# Patient Record
Sex: FEMALE | Race: WHITE | NOT HISPANIC OR LATINO | Employment: FULL TIME | URBAN - METROPOLITAN AREA
[De-identification: names, ages, dates, MRNs, and addresses within clinical notes are randomized per-mention and may not be internally consistent; named-entity substitution may affect disease eponyms.]

---

## 2017-01-04 ENCOUNTER — PRENATAL OFFICE VISIT (OUTPATIENT)
Dept: OBGYN | Facility: CLINIC | Age: 27
End: 2017-01-04
Payer: COMMERCIAL

## 2017-01-04 VITALS
BODY MASS INDEX: 22.71 KG/M2 | TEMPERATURE: 97.1 F | HEART RATE: 74 BPM | HEIGHT: 66 IN | SYSTOLIC BLOOD PRESSURE: 113 MMHG | DIASTOLIC BLOOD PRESSURE: 80 MMHG | WEIGHT: 141.3 LBS

## 2017-01-04 PROCEDURE — 99207 ZZC POST PARTUM EXAM: CPT | Performed by: OBSTETRICS & GYNECOLOGY

## 2017-01-04 RX ORDER — PRENATAL VIT/IRON FUM/FOLIC AC 27MG-0.8MG
1 TABLET ORAL DAILY
COMMUNITY
End: 2020-09-16

## 2017-01-04 NOTE — PROGRESS NOTES
Citlalli is here for a 6-week postpartum checkup.    She had a c/s for breech of a viable girl, weight 7 pounds 4 oz., with no complications.  Since delivery, she has been breast feeding.  She has no signs of infection, bleeding or other complications.  She is not pregnant.  We discussed contraceptions and she has chosen condoms.   She denies feeling sad, depressed or too overwhelmed.    PHQ-9 SCORE 1/4/2017   Total Score 0         EXAM:    HEENT: grossly normal.  NECK: no lymphadenopathy or thyroidomegaly.  LUNGS: CTA X 2, no rales or crackles.  BACK: No spinal or CVA tenderness.  HEART: RRR without murmurs clicks or gallops.  ABDOMEN: soft, non tender, good bowel sounds, without masses rebound, guarding or tenderness. Incision well healed.    PELVIC:    Deferred    EXTREMITIES: Warm to touch, good pulses, no ankle edema or calf tenderness.  NEUROLOGIC: grossly normal.    ASSESSMENT:   Normal 6-week postpartum exam after c/s for breech.    PLAN:  Pap smear utd and condoms for contraception. Ok to resume normal activities.    DUY WHITE MD

## 2017-01-04 NOTE — MR AVS SNAPSHOT
"              After Visit Summary   2017    Citlalli Martin    MRN: [id number]           Patient Information     Date Of Birth          1990        Visit Information        Provider Department      2017 11:00 AM Etta Em MD [facility] [place]        Today's Diagnoses     Routine postpartum follow-up    -  1        Follow-ups after your visit        Who to contact     If you have questions or need follow up information about today's clinic visit or your schedule please contact [facility] directly at [phone].  Normal or non-critical lab and imaging results will be communicated to you by GroovinAdshart, letter or phone within 4 business days after the clinic has received the results. If you do not hear from us within 7 days, please contact the clinic through GroovinAdshart or phone. If you have a critical or abnormal lab result, we will notify you by phone as soon as possible.  Submit refill requests through Nano Terra or call your pharmacy and they will forward the refill request to us. Please allow 3 business days for your refill to be completed.          Additional Information About Your Visit        MyChart Information     Nano Terra lets you send messages to your doctor, view your test results, renew your prescriptions, schedule appointments and more. To sign up, go to [email or web address] . Click on \"Log in\" on the left side of the screen, which will take you to the Welcome page. Then click on \"Sign up Now\" on the right side of the page.     You will be asked to enter the access code listed below, as well as some personal information. Please follow the directions to create your username and password.     Your access code is: [email or web address]  Expires: 2017  9:56 AM     Your access code will  in 90 days. If you need help or a new code, please call your [facility] or [phone].        Care EveryWhere ID     This is your Care EveryWhere ID. This could be used by " "other organizations to access your [facility] medical records  [id number]        Your Vitals Were     Pulse Temperature Height BMI (Body Mass Index) Breastfeeding?       74 97.1  F (36.2  C) (Oral) 5' 6\" (1.676 m) 22.82 kg/m2 Yes        Blood Pressure from Last 3 Encounters:   01/04/17 113/80   11/25/16 121/84   11/16/16 126/79    Weight from Last 3 Encounters:   01/04/17 141 lb 4.8 oz (64.093 kg)   11/16/16 161 lb (73.029 kg)   11/08/16 159 lb 3.2 oz (72.213 kg)              Today, you had the following     No orders found for display       Primary Care Provider Office Phone # Fax #    Kenny Roberson [phone]       [address] [address]        Thank you!     Thank you for choosing [facility]  for your care. Our goal is always to provide you with excellent care. Hearing back from our patients is one way we can continue to improve our services. Please take a few minutes to complete the written survey that you may receive in the mail after your visit with us. Thank you!             Your Updated Medication List - Protect others around you: Learn how to safely use, store and throw away your medicines at www.disposemymeds.org.          This list is accurate as of: 1/4/17 12:11 PM.  Always use your most recent med list.                   Brand Name Dispense Instructions for use    AZATHIOPRINE PO      Take 125 mg by mouth 2 times daily       prenatal multivitamin  plus iron 27-0.8 MG Tabs per tablet      Take 1 tablet by mouth daily         "

## 2017-01-04 NOTE — NURSING NOTE
"Chief Complaint   Patient presents with     Post Partum Exam     post ob       Initial /80 mmHg  Pulse 74  Temp(Src) 97.1  F (36.2  C) (Oral)  Ht 5' 6\" (1.676 m)  Wt 141 lb 4.8 oz (64.093 kg)  BMI 22.82 kg/m2  Breastfeeding? Yes Estimated body mass index is 22.82 kg/(m^2) as calculated from the following:    Height as of this encounter: 5' 6\" (1.676 m).    Weight as of this encounter: 141 lb 4.8 oz (64.093 kg).  BP completed using cuff size: regular        The following HM Due: NONE      The following patient reported/Care Every where data was sent to:  P ABSTRACT QUALITY INITIATIVES [[id number]]       patient has appointment for today  Francisca Bassett                 "

## 2017-01-05 ASSESSMENT — PATIENT HEALTH QUESTIONNAIRE - PHQ9: SUM OF ALL RESPONSES TO PHQ QUESTIONS 1-9: 0

## 2018-09-12 ENCOUNTER — PRENATAL OFFICE VISIT (OUTPATIENT)
Dept: OBGYN | Facility: CLINIC | Age: 28
End: 2018-09-12
Payer: COMMERCIAL

## 2018-09-12 ENCOUNTER — PRENATAL OFFICE VISIT (OUTPATIENT)
Dept: NURSING | Facility: CLINIC | Age: 28
End: 2018-09-12
Payer: COMMERCIAL

## 2018-09-12 VITALS
HEART RATE: 81 BPM | WEIGHT: 121.8 LBS | HEIGHT: 66 IN | DIASTOLIC BLOOD PRESSURE: 87 MMHG | TEMPERATURE: 98 F | BODY MASS INDEX: 19.58 KG/M2 | SYSTOLIC BLOOD PRESSURE: 115 MMHG

## 2018-09-12 VITALS
WEIGHT: 121.8 LBS | HEART RATE: 81 BPM | BODY MASS INDEX: 19.58 KG/M2 | DIASTOLIC BLOOD PRESSURE: 81 MMHG | TEMPERATURE: 98 F | HEIGHT: 66 IN | SYSTOLIC BLOOD PRESSURE: 115 MMHG

## 2018-09-12 DIAGNOSIS — O99.611: ICD-10-CM

## 2018-09-12 DIAGNOSIS — K50.90: ICD-10-CM

## 2018-09-12 DIAGNOSIS — Z12.4 CERVICAL CANCER SCREENING: ICD-10-CM

## 2018-09-12 DIAGNOSIS — Z34.90 SUPERVISION OF NORMAL PREGNANCY: Primary | ICD-10-CM

## 2018-09-12 DIAGNOSIS — O34.219 PREVIOUS CESAREAN DELIVERY, ANTEPARTUM CONDITION OR COMPLICATION: Primary | ICD-10-CM

## 2018-09-12 DIAGNOSIS — O21.9 NAUSEA AND VOMITING IN PREGNANCY: ICD-10-CM

## 2018-09-12 DIAGNOSIS — Z23 NEED FOR TDAP VACCINATION: ICD-10-CM

## 2018-09-12 PROBLEM — O99.619: Status: ACTIVE | Noted: 2018-09-12

## 2018-09-12 LAB
ABO + RH BLD: NORMAL
ABO + RH BLD: NORMAL
ALBUMIN UR-MCNC: NEGATIVE MG/DL
APPEARANCE UR: CLEAR
BILIRUB UR QL STRIP: NEGATIVE
BLD GP AB SCN SERPL QL: NORMAL
BLOOD BANK CMNT PATIENT-IMP: NORMAL
COLOR UR AUTO: YELLOW
ERYTHROCYTE [DISTWIDTH] IN BLOOD BY AUTOMATED COUNT: 12.3 % (ref 10–15)
GLUCOSE UR STRIP-MCNC: NEGATIVE MG/DL
HCT VFR BLD AUTO: 37.9 % (ref 35–47)
HGB BLD-MCNC: 13 G/DL (ref 11.7–15.7)
HGB UR QL STRIP: NEGATIVE
KETONES UR STRIP-MCNC: NEGATIVE MG/DL
LEUKOCYTE ESTERASE UR QL STRIP: NEGATIVE
MCH RBC QN AUTO: 30.1 PG (ref 26.5–33)
MCHC RBC AUTO-ENTMCNC: 34.3 G/DL (ref 31.5–36.5)
MCV RBC AUTO: 88 FL (ref 78–100)
NITRATE UR QL: NEGATIVE
PH UR STRIP: 6 PH (ref 5–7)
PLATELET # BLD AUTO: 259 10E9/L (ref 150–450)
RBC # BLD AUTO: 4.32 10E12/L (ref 3.8–5.2)
SOURCE: NORMAL
SP GR UR STRIP: 1.01 (ref 1–1.03)
SPECIMEN EXP DATE BLD: NORMAL
UROBILINOGEN UR STRIP-ACNC: 0.2 EU/DL (ref 0.2–1)
WBC # BLD AUTO: 9.3 10E9/L (ref 4–11)

## 2018-09-12 PROCEDURE — 99207 ZZC NO CHARGE NURSE ONLY: CPT

## 2018-09-12 PROCEDURE — G0145 SCR C/V CYTO,THINLAYER,RESCR: HCPCS | Performed by: OBSTETRICS & GYNECOLOGY

## 2018-09-12 PROCEDURE — 81003 URINALYSIS AUTO W/O SCOPE: CPT | Performed by: OBSTETRICS & GYNECOLOGY

## 2018-09-12 PROCEDURE — 86900 BLOOD TYPING SEROLOGIC ABO: CPT | Performed by: OBSTETRICS & GYNECOLOGY

## 2018-09-12 PROCEDURE — 36415 COLL VENOUS BLD VENIPUNCTURE: CPT | Performed by: OBSTETRICS & GYNECOLOGY

## 2018-09-12 PROCEDURE — 86780 TREPONEMA PALLIDUM: CPT | Performed by: OBSTETRICS & GYNECOLOGY

## 2018-09-12 PROCEDURE — 86850 RBC ANTIBODY SCREEN: CPT | Performed by: OBSTETRICS & GYNECOLOGY

## 2018-09-12 PROCEDURE — 85027 COMPLETE CBC AUTOMATED: CPT | Performed by: OBSTETRICS & GYNECOLOGY

## 2018-09-12 PROCEDURE — 99207 ZZC FIRST OB VISIT: CPT | Performed by: OBSTETRICS & GYNECOLOGY

## 2018-09-12 PROCEDURE — 86762 RUBELLA ANTIBODY: CPT | Performed by: OBSTETRICS & GYNECOLOGY

## 2018-09-12 PROCEDURE — 86901 BLOOD TYPING SEROLOGIC RH(D): CPT | Performed by: OBSTETRICS & GYNECOLOGY

## 2018-09-12 PROCEDURE — 87086 URINE CULTURE/COLONY COUNT: CPT | Performed by: OBSTETRICS & GYNECOLOGY

## 2018-09-12 PROCEDURE — 87340 HEPATITIS B SURFACE AG IA: CPT | Performed by: OBSTETRICS & GYNECOLOGY

## 2018-09-12 PROCEDURE — 87389 HIV-1 AG W/HIV-1&-2 AB AG IA: CPT | Performed by: OBSTETRICS & GYNECOLOGY

## 2018-09-12 PROCEDURE — 87624 HPV HI-RISK TYP POOLED RSLT: CPT | Performed by: OBSTETRICS & GYNECOLOGY

## 2018-09-12 RX ORDER — ONDANSETRON 4 MG/1
4 TABLET, FILM COATED ORAL EVERY 8 HOURS PRN
Qty: 60 TABLET | Refills: 1 | Status: SHIPPED | OUTPATIENT
Start: 2018-09-12 | End: 2019-02-19

## 2018-09-12 NOTE — MR AVS SNAPSHOT
After Visit Summary   9/12/2018    Citlalli Martin    MRN: [id number]           Patient Information     Date Of Birth          1990        Visit Information        Provider Department      9/12/2018 10:00 AM RD OB NURSE EDUCATION [facility]        Today's Diagnoses     Supervision of normal pregnancy    -  1    Need for Tdap vaccination           Follow-ups after your visit        Additional Services     MAT FETAL MED CTR REFERRAL-PREGNANCY       Body mass index is 19.66 kg/(m^2).    >> Patient may proceed with recommendations for further testing as directed by the Maternal Fetal Medicine Specialist >>    >> If requesting Fetal Echo: MFM will determine appropriate location for exam due to indication.    >> If requesting Lung Maturity Amnio:  If results indicate fetal lung maturity, induction or C/S is recommended within 36 hours.  Please schedule accordingly.     Dear Patient:   Please be aware that coverage of these services is subject to the terms and limitations of your health insurance plan.  Call member services at your health plan with any benefit or coverage questions.      Please bring the following to your appointment:    >>  Any x-rays, CTs or MRIs which have been performed.  Contact the facility where they were done to arrange for  prior to your scheduled appointment.  Any new CT, MRI or other procedures ordered by your specialist must be performed at a [facility] facility or coordinated by your clinic's referral office.  >>  List of current medications   >>  This referral request   >>  Any documents/labs given to you for this referral                  Your next 10 appointments already scheduled     Sep 18, 2018 12:40 PM CDT   US OB < 14 WEEKS SINGLE with RDUS1   [facility] ([facility])    [address] [place] [address]  [address] [place] [address] [phone]           How do I prepare for my exam? (Food and drink instructions)  Drink four 8-ounce glasses of fluid an hour before your exam. If you need to empty your bladder before your exam, try to release only a little urine. Then, drink another glass of fluid.  How do I prepare for my exam? (Other instructions) You may have up to two family members in the exam room. If you bring a small child, an adult must be there to care for him or her. No video or camera photography during the procedure.  What should I wear: Wear comfortable clothes.  How long does the exam take: Most ultrasounds take 30 to 60 minutes.  What should I bring: Bring a list of your medicines, including vitamins, minerals and over-the-counter drugs. It is safest to leave personal items at home.  Do I need a :  No  is needed.  What do I need to tell my doctor: Tell your doctor about any allergies you may have.  What should I do after the exam: No restrictions, You may resume normal activities.  What is this test: An ultrasound uses sound waves to make pictures of the body. Sound waves do not cause pain. The only discomfort may be the pressure of the wand against your skin or full bladder.  Who should I call with questions: If you have any questions, please call the Imaging Department where you will have your exam. Directions, parking instructions, and other information is available on our website, [employer]scroll kit/imaging.            Oct 09, 2018  9:30 AM CDT   ESTABLISHED PRENATAL with Etta Em MD   [facility] ([facility])    [address] [place] [address] [phone]              Future tests that were ordered for you today     Open Future Orders        Priority Expected Expires Ordered    US OB < 14 Weeks Single Routine  9/12/2019 9/12/2018    MFM Genetic Counseling Routine  9/12/2019 9/12/2018    Maternal Fetal Nuchal Translucency Routine  7/12/2019 9/12/2018            Who to contact     If you have questions or need follow up information  "about today's clinic visit or your schedule please contact [facility] directly at [phone].  Normal or non-critical lab and imaging results will be communicated to you by O' Doughty'shart, letter or phone within 4 business days after the clinic has received the results. If you do not hear from us within 7 days, please contact the clinic through O' Doughty'shart or phone. If you have a critical or abnormal lab result, we will notify you by phone as soon as possible.  Submit refill requests through Avtozaper or call your pharmacy and they will forward the refill request to us. Please allow 3 business days for your refill to be completed.          Additional Information About Your Visit        O' Doughty'shart Information     Avtozaper lets you send messages to your doctor, view your test results, renew your prescriptions, schedule appointments and more. To sign up, go to [email or web address] . Click on \"Log in\" on the left side of the screen, which will take you to the Welcome page. Then click on \"Sign up Now\" on the right side of the page.     You will be asked to enter the access code listed below, as well as some personal information. Please follow the directions to create your username and password.     Your access code is: [email or web address]  Expires: 2018 12:24 PM     Your access code will  in 90 days. If you need help or a new code, please call your [facility] clinic or [phone].        Care EveryWhere ID     This is your Care EveryWhere ID. This could be used by other organizations to access your [facility] medical records  [id number]        Your Vitals Were     Pulse Temperature Height Last Period Breastfeeding? BMI (Body Mass Index)    81 98  F (36.7  C) 5' 6\" (1.676 m) 2018 No 19.66 kg/m2       Blood Pressure from Last 3 Encounters:   18 115/87   18 115/81   17 113/80    Weight from Last 3 Encounters:   18 121 lb 12.8 oz (55.2 kg)   18 121 lb 12.8 oz (55.2 kg)   17 " 141 lb 4.8 oz (64.1 kg)              We Performed the Following     ABO/Rh type and screen     CBC with platelets     Hepatitis B surface antigen     HIV Antigen Antibody Combo     MAT FETAL MED CTR REFERRAL-PREGNANCY     Rubella Antibody IgG Quantitative     Treponema Abs w Reflex to RPR and Titer     UA without Microscopic     Urine Culture Aerobic Bacterial        Primary Care Provider Office Phone # Fax #    Kenny Roberson [phone]       [facility] [address] [facility] [address]        Equal Access to Services     BRANDON HANCOCK : Hadii aad ku hadasho Soomaali, waaxda luqadaha, qaybta kaalmada adeegyada, waxay idiin hayaan adeeg kharalisa laalejandra kelley. So [facility] [phone].    ATENCIÓN: Si habla español, tiene a cat disposición servicios gratuitos de asistencia lingüística. [facility] [phone].    We comply with applicable federal civil rights laws and [place] laws. We do not discriminate on the basis of race, color, national origin, age, disability, sex, sexual orientation, or gender identity.            Thank you!     Thank you for choosing [facility]  for your care. Our goal is always to provide you with excellent care. Hearing back from our patients is one way we can continue to improve our services. Please take a few minutes to complete the written survey that you may receive in the mail after your visit with us. Thank you!             Your Updated Medication List - Protect others around you: Learn how to safely use, store and throw away your medicines at www.disposemymeds.org.          This list is accurate as of 9/12/18 12:24 PM.  Always use your most recent med list.                   Brand Name Dispense Instructions for use Diagnosis    AZATHIOPRINE PO      Take 125 mg by mouth 2 times daily        prenatal multivitamin plus iron 27-0.8 MG Tabs per tablet      Take 1 tablet by mouth daily

## 2018-09-12 NOTE — NURSING NOTE
"Chief Complaint   Patient presents with     Prenatal Care     10+3       Initial /87  Pulse 81  Temp 98  F (36.7  C) (Oral)  Ht 5' 6\" (1.676 m)  Wt 121 lb 12.8 oz (55.2 kg)  LMP 2018  BMI 19.66 kg/m2 Estimated body mass index is 19.66 kg/(m^2) as calculated from the following:    Height as of this encounter: 5' 6\" (1.676 m).    Weight as of this encounter: 121 lb 12.8 oz (55.2 kg).  BP completed using cuff size: regular        The following HM Due: pap smear      The following patient reported/Care Every where data was sent to:  P ABSTRACT QUALITY INITIATIVES [[id number]]        patient has appointment for today  Francisca Bassett                "

## 2018-09-12 NOTE — PROGRESS NOTES
CC: New Ob visit  HPI: Citlalli Martin is a 28 year old  here for new Ob visit.  Patient's last menstrual period was 2018..  She is 10w3d by known LMP, giving her an EDC of 19.  The pregnancy was planned and she and her  are feeling excited.    This far the pregnancy has been uneventful other than mild nausea and lots of fatigue.  She has occasional vomiting.    Her previous pregnancy was complicated by spontaneous bowel obstruction due to crohns in the second trimester, was hospitalized for that.  Otherwise uncomplicated.  She had a LTCS for breech and desires repeat.    Past Medical History:   Diagnosis Date     Crohn's disease (H)      Depression, major      Lupus        Past Surgical History:   Procedure Laterality Date     APPENDECTOMY      with resection for Crohns      SECTION N/A 2016    Procedure:  SECTION;  Surgeon: Etta Em MD;  Location: [facility] L+D     SMALL BOWEL RESECTION      related to Crohns     Obstetric History       T1      L0     SAB0   TAB0   Ectopic0   Multiple0   Live Births0       # Outcome Date GA Lbr Gordon/2nd Weight Sex Delivery Anes PTL Lv   2 Current            1 Term 16 39w1d  7 lb 4 oz (3.289 kg) F CS-LTranv Spinal        Name: GINGER,BABYRuben MOORE      Apgar1:  6                Apgar5: 7              Current Outpatient Prescriptions:      AZATHIOPRINE PO, Take 125 mg by mouth 2 times daily , Disp: , Rfl:      Prenatal Vit-Fe Fumarate-FA (PRENATAL MULTIVITAMIN  PLUS IRON) 27-0.8 MG TABS per tablet, Take 1 tablet by mouth daily, Disp: , Rfl:     Allergies   Allergen Reactions     Contrast Dye Nausea and Vomiting     Diagnostic X-Ray Materials      No Clinical Screening - See Comments Rash       Family History   Problem Relation Age of Onset     Diabetes Father      type 2     Arthritis Father      Hypertension Father      Hyperlipidemia Father      Bipolar Disorder Mother      Coronary Artery Disease Paternal  "Grandfather      Heart Failure Paternal Grandfather      Cerebrovascular Disease Paternal Grandmother      Chronic Obstructive Pulmonary Disease Paternal Grandmother        Past medical, social, surgical and family history were reviewed and updated in EPIC.    ROS: No TIA's or unusual headaches, no dysphagia.  No prolonged cough. No dyspnea or chest pain on exertion.  No abdominal pain, change in bowel habits, black or bloody stools.  No urinary tract symptoms.  No new or unusual musculoskeletal symptoms.  Normal menses, no abnormal vaginal bleeding, discharge or unexpected pelvic pain. No new breast lumps, breast pain or nipple discharge.    PE: /87  Pulse 81  Temp 98  F (36.7  C) (Oral)  Ht 5' 6\" (1.676 m)  Wt 121 lb 12.8 oz (55.2 kg)  LMP 07/01/2018  BMI 19.66 kg/m2    Gen: Healthy appearing female in no acute distress  Heart: RRR  Lungs: CTAB  Abd: +BS, SNT  Ex: No C/C/E, no suspicious rashes or lesions    Pelvic: Normal vulva and vagina with scant white d/c.  Cervix without lesions, no CMT.  Uterus approximately 8-10 week size, mobile, NT.  Adnexa NT, no masses.    BSUS: single live iup, +FCA, CRL 9w 1d.    A/P:  1) IUP at 10w3d, crohn's dz, h/o LTCS        PNL today, pap today        Reviewed anticipated course of prenatal care        Reviewed recommendations for weight gain, activity and diet        Plan to continue care for crohn's as planned and level II.  Likely serial growth like last pregnancy        We discussed options for genetic screening and diagnosis including CVS/amnio, first trimester screen and quad screen.  We discussed a fetal survey will be performed around 18-20 weeks. They decline genetic screening        We did not have a full discussion about TOLAC since she desires repeat c/s.  Can re-visit if she desires.  For now, plan for c/s at 39wks.        Dating about 1 wk off from LMP based on my bsus, will get formal dating this week       RTC 4 weeks    Etta Em MD         "

## 2018-09-12 NOTE — PROGRESS NOTES
Important Information for Provider:     Patient presents for new ob teaching and labs, second pregnancy, history of C section. Ordered first trimester screening. Has appointment with Dr Em today for NOB. Dating ultrasound scheduled 8/18/18      Caffeine intake/servings daily - 1  Calcium intake/servings daily - 3  Exercise 5 times weekly - describe ; walks, jogs, eliptical  Sunscreen used - Yes  Seatbelts used - Yes  Guns stored in the home - No  Self Breast Exam - Yes  Pap test up to date -  Yes  Eye exam up to date -  Yes  Dental exam up to date -  Yes  DEXA scan up to date -  No  Flex Sig/Colonoscopy up to date -  Yes  Mammography up to date -  No  Immunizations reviewed and up to date - Yes  Abuse: Current or Past (Physical, Sexual or Emotional) - No  Do you feel safe in your environment - Yes  Do you cope well with stress - Yes  Do you suffer from insomnia - No              Prenatal OB Questionnaire  Patient supplied answers from flow sheet for:  Prenatal OB Questionnaire.  Past Medical History  Diabetes?: No  Hypertension : No  Heart disease, mitral valve prolapse or rheumatic fever?: No  An autoimmune disease such as lupus or rheumatoid arthritis?: (!) Yes (Lupus, )  Kidney disease or urinary tract infection?: No  Epilepsy, seizures or spells?: No  Migraine headaches?: No  A stroke or loss of function or sensation?: No  Any other neurological problems?: No  Have you ever been treated for depression?: (!) Yes  Are you having problems with crying spells or loss of self-esteem?: No  Have you ever required psychiatric care?: No  Have you ever had hepatitis, liver disease or jaundice?: No  Have you been treated for blood clots in your veins, deep vein thromosis, inflammation in the veins, thrombosis, phlebitis, pulmonary embolism or varicosities?: No  Have you had excessive bleeding after surgery or dental work?: No  Do you bleed more than other women after a cut or scratch?: No  Do you have a history of  anemia?: No  Have you ever had thyroid problems or taken thyroid medication?: No   Do you have any endocrine problems?: No  Have you ever been in a major accident or suffered serious trauma?: No  Within the last year, has anyone hit, slapped, kicked or otherwise hurt you?: No  In the last year, has anyone forced you to have sex when you didn't want to?: No    Past Medical History 2   Have you ever received a blood transfusion?: No  Would you refuse a blood transfusion if a doctor judged it to be medically necessary?: No   If you answered Yes, would you rather die than receive a blood transfusion?: No  If you answered Yes, is this for Jewish reasons?: No  Does anyone in your home smoke?: No  Do you use tobacco products?: No  Do you drink beer, wine or hard liquor?: No  Do you use any of the following: marijuana, speed, cocaine, heroin, hallucinogens or other drugs?: No   Is your blood type Rh negative?: No  Have you ever had abnormal antibodies in your blood?: No  Have you ever had asthma?: No  Have you ever had tuberculosis?: No  Do you have any allergies to drugs or over-the-counter medications?: (!) Yes  Allergies: Dust Mites, Aspartame, Ethanol, Venlafaxine, Hydrochloride, Sertraline: No  Have you had any breast problems?: No  Have you ever ?: (!) Yes  Have you had any gynecological surgical procedures such as cervical conization, a LEEP procedure, laser treatment, cryosurgery of the cervix or a dilation and curettage, etc?: No  Have you ever had any other surgical procedures?: (!) Yes  Have you been hospitalized for a nonsurgical reason excluding normal delivery?: (!) Yes  Have you ever had any anesthetic complications?: No  Have you ever had an abnormal pap smear?: No    Past Medical History (Continued)  Do you have a history of abnormalities of the uterus?: No  Did your mother take WERO or any other hormones when she was pregnant with you?: No  Did it take you more than a year to become pregnant?:  No  Have you ever been evaluated or treated for infertility?: No  Is there a history of medical problems in your family, which you feel may be important to this pregnancy?: No  Do you have any other problems we have not asked about which you feel may be important to this pregnancy?: (!) Yes (Crohns )    Symptoms since last menstrual period  Do you have any of the following symptoms: abdominal pain, blood in stools or urine, chest pain, shortness of breath, coughing or vomiting up blood, your heart racing or skipping beats, nausea and vomiting, pain on urination or vaginal discharge or bleed: (!) Yes (nausea)  Will the patient be 35 years old or older at the time of delivery?: No    Has the patient, baby's father or anyone in either family had:  Thalassemia (Italian, Greek, Mediterranean or  background only) and an MCV result less than 80?: No  Neural tube defect such as meningomyelocele, spina bifida or anencephaly?: No  Congenital heart defect?: No  Down's Syndrome?: No  Jono-Sachs disease (Church, Cajun, [place])?: No  Sickle cell disease or trait ()?: No  Hemophilia or other inherited problems of blood?: No  Muscular dystrophy?: No  Cystic fibrosis?: No  Rapides's chorea?: No  Mental retardation/autism?: No  If yes, was the person tested for fragile X?: No  Any other inherited genetic or chromosomal disorder?: No  Maternal metabolic disorder (e.g Insulin-dependent diabetes, PKU)?: No  A child with birth defects not listed above?: No  Recurrent pregnancy loss or stillbirth?: No   Has the patient had any medications/street drugs/alcohol since her last menstrual period?: No  Does the patient or baby's father have any other genetic risks?: No    Infection History   Do you object to being tested for Hepatitis B?: No  Do you object to being tested for HIV?: No   Do you feel that you are at high risk for coming in contact with the AIDS virus?: No  Have you ever been treated for tuberculosis?:  No  Have you ever had a positive skin test for tuberculosis?: No  Do you live with someone who has tuberculosis?: No  Have you ever been exposed to tuberculosis?: (!) Yes  Do you have genital herpes?: No  Does your partner have genital herpes?: No  Have you had a viral illness since your last period?: No  Have you ever had gonorrhea, chlamydia, syphilis, venereal warts, trichomoniasis, pelvic inflammatory disease or any other sexually transmitted disease?: No  Do you know if you are a genital group B streptococcus carrier?: No  Have you had chicken pox/varicella?: (!) Yes   Have you been vaccinated against chicken Pox?: No  Have you had any other infectious diseases?: No      Allergies as of 9/12/2018:    Allergies as of 09/12/2018 - [facility] as Reviewed 09/12/2018   Allergen Reaction Noted     Contrast dye Nausea and Vomiting 08/29/2016     Diagnostic x-ray materials  03/08/2011     No clinical screening - see comments Rash 11/04/2015       Current medications are:  Current Outpatient Prescriptions   Medication Sig Dispense Refill     AZATHIOPRINE PO Take 125 mg by mouth 2 times daily        Prenatal Vit-Fe Fumarate-FA (PRENATAL MULTIVITAMIN  PLUS IRON) 27-0.8 MG TABS per tablet Take 1 tablet by mouth daily           Early ultrasound screening tool:    Does patient have irregular periods?  No  Did patient use hormonal birth control in the three months prior to positive urine pregnancy test? No  Is the patient breastfeeding?  No  Is the patient 10 weeks or greater at time of education visit?  Yes

## 2018-09-12 NOTE — LETTER
September 14, 2018      Citlalli Martin  [address] [facility] [address]        Dear ,    We are writing to inform you of your test results.    Your test results fall within the expected range(s) or remain unchanged from previous results.  Please continue with current treatment plan.    Resulted Orders   Treponema Abs w Reflex to RPR and Titer   Result Value Ref Range    Treponema Antibodies Nonreactive NR^Nonreactive   HIV Antigen Antibody Combo   Result Value Ref Range    HIV Antigen Antibody Combo Nonreactive NR^Nonreactive          Comment:      HIV-1 p24 Ag & HIV-1/HIV-2 Ab Not Detected   Rubella Antibody IgG Quantitative   Result Value Ref Range    Rubella Antibody IgG Quantitative 30 IU/mL      Comment:      Positive.  Suggests previous exposure or immunization and probable immunity  Reference Range:    Unvaccinated Negative 0-7 IU/mL  Vaccinated or previous exposure Positive 10 IU/ml or greater     ABO/Rh type and screen   Result Value Ref Range    ABO B     RH(D) Pos     Antibody Screen Neg     Test Valid Only At          [facility]    Specimen Expires 09/15/2018    Hepatitis B surface antigen   Result Value Ref Range    Hep B Surface Agn Nonreactive NR^Nonreactive   UA without Microscopic   Result Value Ref Range    Color Urine Yellow     Appearance Urine Clear     Glucose Urine Negative NEG^Negative mg/dL    Bilirubin Urine Negative NEG^Negative    Ketones Urine Negative NEG^Negative mg/dL    Specific Gravity Urine 1.015 1.003 - 1.035    Blood Urine Negative NEG^Negative    pH Urine 6.0 5.0 - 7.0 pH    Protein Albumin Urine Negative NEG^Negative mg/dL    Urobilinogen Urine 0.2 0.2 - 1.0 EU/dL    Nitrite Urine Negative NEG^Negative    Leukocyte Esterase Urine Negative NEG^Negative    Source Midstream Urine    Urine Culture Aerobic Bacterial   Result Value Ref Range    Specimen Description Midstream Urine     Culture Micro No  growth    CBC with platelets   Result Value Ref Range    WBC 9.3 4.0 - 11.0 10e9/L    RBC Count 4.32 3.8 - 5.2 10e12/L    Hemoglobin 13.0 11.7 - 15.7 g/dL    Hematocrit 37.9 35.0 - 47.0 %    MCV 88 78 - 100 fl    MCH 30.1 26.5 - 33.0 pg    MCHC 34.3 31.5 - 36.5 g/dL    RDW 12.3 10.0 - 15.0 %    Platelet Count 259 150 - 450 10e9/L       If you have any questions or concerns, please call the clinic at the number listed above.       Sincerely,        [facility] Nurse

## 2018-09-12 NOTE — LETTER
September 19, 2018    Citlalli Autumn Martin  [address] [facility] [address] [address]    Dear Citlalli,  We are happy to inform you that your PAP smear result from 09/12/18 is normal.  We are now able to do a follow up test on PAP smears. The DNA test is for HPV (Human Papilloma Virus). Cervical cancer is closely linked with certain types of HPV. Your results showed no evidence of high risk HPV.  Therefore we recommend you return in 1 year for your next pap smear if you are taking the medication Azathioprine.  You will still need to return to the clinic every year for an annual exam and other preventive tests.  Please contact the clinic at [phone] with any questions.  Sincerely,    Etta Em MD./  Stephanie Peguero RN-Pap Tracking

## 2018-09-12 NOTE — MR AVS SNAPSHOT
After Visit Summary   2018    Citlalli Martin    MRN: [id number]           Patient Information     Date Of Birth          1990        Visit Information        Provider Department      2018 11:00 AM Etta Em MD [facility]        Today's Diagnoses     Previous  delivery, antepartum condition or complication    -  1    Cervical cancer screening        Maternal Crohn's disease affecting pregnancy in first trimester, antepartum (H)        Nausea and vomiting in pregnancy           Follow-ups after your visit        Your next 10 appointments already scheduled     Sep 18, 2018 12:40 PM CDT   US OB < 14 WEEKS SINGLE with RDUS1   [facility] ([facility]    [address] [place] [address]  [address] [place] [id number] [phone]           How do I prepare for my exam? (Food and drink instructions) Drink four 8-ounce glasses of fluid an hour before your exam. If you need to empty your bladder before your exam, try to release only a little urine. Then, drink another glass of fluid.  How do I prepare for my exam? (Other instructions) You may have up to two family members in the exam room. If you bring a small child, an adult must be there to care for him or her. No video or camera photography during the procedure.  What should I wear: Wear comfortable clothes.  How long does the exam take: Most ultrasounds take 30 to 60 minutes.  What should I bring: Bring a list of your medicines, including vitamins, minerals and over-the-counter drugs. It is safest to leave personal items at home.  Do I need a :  No  is needed.  What do I need to tell my doctor: Tell your doctor about any allergies you may have.  What should I do after the exam: No restrictions, You may resume normal activities.  What is this test: An ultrasound uses sound waves to make pictures of the body. Sound waves do not cause pain. The only discomfort may be the  "pressure of the wand against your skin or full bladder.  Who should I call with questions: If you have any questions, please call the Imaging Department where you will have your exam. Directions, parking instructions, and other information is available on our website, [email or web address].            Oct 09, 2018  9:30 AM CDT   ESTABLISHED PRENATAL with Etta Em MD   [facility] [place]    [address] [place] [id number] [phone]              Future tests that were ordered for you today     Open Future Orders        Priority Expected Expires Ordered    US OB < 14 Weeks Single Routine  9/12/2019 9/12/2018    MFM Genetic Counseling Routine  9/12/2019 9/12/2018    Maternal Fetal Nuchal Translucency Routine  7/12/2019 9/12/2018            Who to contact     If you have questions or need follow up information about today's clinic visit or your schedule please contact [facility] directly at [phone].  Normal or non-critical lab and imaging results will be communicated to you by MyChart, letter or phone within 4 business days after the clinic has received the results. If you do not hear from us within 7 days, please contact the clinic through MyChart or phone. If you have a critical or abnormal lab result, we will notify you by phone as soon as possible.  Submit refill requests through [email or web address] or call your pharmacy and they will forward the refill request to us. Please allow 3 business days for your refill to be completed.          Additional Information About Your Visit        [email or web address] Information     [email or web address] lets you send messages to your doctor, view your test results, renew your prescriptions, schedule appointments and more. To sign up, go to [email or web address] . Click on \"Log in\" on the left side of the screen, which will take you to the Welcome page. Then click on \"Sign up Now\" on the right side of the page. " "    You will be asked to enter the access code listed below, as well as some personal information. Please follow the directions to create your username and password.     Your access code is: [email or web address]  Expires: 2018 12:24 PM     Your access code will  in 90 days. If you need help or a new code, please call your [place] clinic or [phone].        Care EveryWhere ID     This is your Care EveryWhere ID. This could be used by other organizations to access your [place] medical records  [id number]        Your Vitals Were     Pulse Temperature Height Last Period BMI (Body Mass Index)       81 98  F (36.7  C) (Oral) 5' 6\" (1.676 m) 2018 19.66 kg/m2        Blood Pressure from Last 3 Encounters:   18 115/87   18 115/81   17 113/80    Weight from Last 3 Encounters:   18 121 lb 12.8 oz (55.2 kg)   18 121 lb 12.8 oz (55.2 kg)   17 141 lb 4.8 oz (64.1 kg)              We Performed the Following     HPV High Risk Types DNA Cervical     Pap imaged thin layer screen with HPV - recommended age 30 - 65 years (select HPV order below)     US OB Limited One Or More Fetuses          Today's Medication Changes          These changes are accurate as of 18  1:07 PM.  If you have any questions, ask your nurse or doctor.               Start taking these medicines.        Dose/Directions    ondansetron 4 MG tablet   Commonly known as:  ZOFRAN   Used for:  Nausea and vomiting in pregnancy   Started by:  Etta Em MD        Dose:  4 mg   Take 1 tablet (4 mg) by mouth every 8 hours as needed for nausea   Quantity:  60 tablet   Refills:  1            Where to get your medicines      These medications were sent to [email or web address] [address] [facility] [address] [place] [address] AT [address]   [facility] [address]     Phone:  [phone]     ondansetron 4 MG tablet                Primary Care Provider Office Phone # Fax #    " Kenny Roberson [phone]       [facility] [address] [facility] [address]        Equal Access to Services     BRANDON HANCOCK : Brandon Akbar, prudencio dewitt, zuleikamaryjo whytemaruss kenyonsari, haley reggiein hayaadaniel kenyonlogan pandey nilay kelley. So [facility] [phone].    ATENCIÓN: Si habla español, tiene a act disposición servicios gratuitos de asistencia lingüística. Llame al [phone].    We comply with applicable federal civil rights laws and [place] laws. We do not discriminate on the basis of race, color, national origin, age, disability, sex, sexual orientation, or gender identity.            Thank you!     Thank you for choosing [facility]  for your care. Our goal is always to provide you with excellent care. Hearing back from our patients is one way we can continue to improve our services. Please take a few minutes to complete the written survey that you may receive in the mail after your visit with us. Thank you!             Your Updated Medication List - Protect others around you: Learn how to safely use, store and throw away your medicines at www.disposemymeds.org.          This list is accurate as of 9/12/18  1:07 PM.  Always use your most recent med list.                   Brand Name Dispense Instructions for use Diagnosis    AZATHIOPRINE PO      Take 125 mg by mouth 2 times daily        ondansetron 4 MG tablet    ZOFRAN    60 tablet    Take 1 tablet (4 mg) by mouth every 8 hours as needed for nausea    Nausea and vomiting in pregnancy       prenatal multivitamin plus iron 27-0.8 MG Tabs per tablet      Take 1 tablet by mouth daily

## 2018-09-13 LAB
BACTERIA SPEC CULT: NO GROWTH
HBV SURFACE AG SERPL QL IA: NONREACTIVE
HIV 1+2 AB+HIV1 P24 AG SERPL QL IA: NONREACTIVE
RUBV IGG SERPL IA-ACNC: 30 IU/ML
SPECIMEN SOURCE: NORMAL
T PALLIDUM AB SER QL: NONREACTIVE

## 2018-09-15 LAB
COPATH REPORT: NORMAL
PAP: NORMAL

## 2018-09-18 LAB
FINAL DIAGNOSIS: NORMAL
HPV HR 12 DNA CVX QL NAA+PROBE: NEGATIVE
HPV16 DNA SPEC QL NAA+PROBE: NEGATIVE
HPV18 DNA SPEC QL NAA+PROBE: NEGATIVE
SPECIMEN DESCRIPTION: NORMAL
SPECIMEN SOURCE CVX/VAG CYTO: NORMAL

## 2018-09-19 PROBLEM — Z12.4 CERVICAL CANCER SCREENING: Status: ACTIVE | Noted: 2018-09-19

## 2018-09-27 ENCOUNTER — RADIANT APPOINTMENT (OUTPATIENT)
Dept: ULTRASOUND IMAGING | Facility: CLINIC | Age: 28
End: 2018-09-27
Attending: OBSTETRICS & GYNECOLOGY
Payer: COMMERCIAL

## 2018-09-27 DIAGNOSIS — Z34.90 SUPERVISION OF NORMAL PREGNANCY: ICD-10-CM

## 2018-09-27 PROCEDURE — 76801 OB US < 14 WKS SINGLE FETUS: CPT

## 2018-10-02 ENCOUNTER — PRE VISIT (OUTPATIENT)
Dept: MATERNAL FETAL MEDICINE | Facility: CLINIC | Age: 28
End: 2018-10-02

## 2018-10-05 ENCOUNTER — HOSPITAL ENCOUNTER (OUTPATIENT)
Dept: ULTRASOUND IMAGING | Facility: CLINIC | Age: 28
Discharge: HOME | End: 2018-10-05
Attending: OBSTETRICS & GYNECOLOGY | Admitting: OBSTETRICS & GYNECOLOGY
Payer: COMMERCIAL

## 2018-10-05 ENCOUNTER — OFFICE VISIT (OUTPATIENT)
Dept: MATERNAL FETAL MEDICINE | Facility: CLINIC | Age: 28
End: 2018-10-05
Attending: OBSTETRICS & GYNECOLOGY
Payer: COMMERCIAL

## 2018-10-05 DIAGNOSIS — O26.90 PREGNANCY RELATED CONDITION, ANTEPARTUM: ICD-10-CM

## 2018-10-05 DIAGNOSIS — Z36.9 FIRST TRIMESTER SCREENING: ICD-10-CM

## 2018-10-05 DIAGNOSIS — Z36.9 FIRST TRIMESTER SCREENING: Primary | ICD-10-CM

## 2018-10-05 PROCEDURE — 82105 ALPHA-FETOPROTEIN SERUM: CPT | Performed by: OBSTETRICS & GYNECOLOGY

## 2018-10-05 PROCEDURE — 84163 PAPPA SERUM: CPT | Performed by: OBSTETRICS & GYNECOLOGY

## 2018-10-05 PROCEDURE — 40000072 ZZH STATISTIC GENETIC COUNSELING, < 16 MIN: Mod: ZF | Performed by: GENETIC COUNSELOR, MS

## 2018-10-05 PROCEDURE — 36415 COLL VENOUS BLD VENIPUNCTURE: CPT | Performed by: OBSTETRICS & GYNECOLOGY

## 2018-10-05 PROCEDURE — 76813 OB US NUCHAL MEAS 1 GEST: CPT

## 2018-10-05 PROCEDURE — 84704 HCG FREE BETACHAIN TEST: CPT | Performed by: OBSTETRICS & GYNECOLOGY

## 2018-10-05 NOTE — PROGRESS NOTES
[facility]  Genetic Counseling Consult    Patient: Citlalli Martin YOB: 1990   Date of Service: 10/05/18      Citlalli Martin was seen with her  at [facility] for genetic consultation to discuss the options for routine screening for fetal chromosome abnormalities.       Impression/Plan:   1.  Citlalli had an ultrasound and blood draw for first trimester screening.  Results are expected within 3-5 days, and will be available in [facility].  We will contact her to discuss the results, and a copy will be forwarded to the office of the referring OB provider.    2. Maternal serum AFP (single marker screen) is recommended after 15 weeks to screen for open neural tube defects. A quad screen should not be performed.    Pregnancy History:   /Parity:    Age at Delivery: 28 year old  REBEL: 2019, by Last Menstrual Period  Gestational Age: 13w5d    No significant complications or exposures were reported in the current pregnancy.    Citlalli s pregnancy history is significant for one term  due to breech presentation. That pregnancy was complicated by a bowel obstruction for which she was hospitalized. Citlalli does not believe the bowel obstruction was related to her Crohns disease as she was not experiencing any other symptoms at the time.     Medical History:   Citlalli was diagnosed with Crohns disease in  for which she takes azothioprine. She had an [facility] consult in her previous pregnancy regarding this history (2016 with Dr. García and Dr. Alberts). See that consult note for details.        Family History:   A three-generation pedigree was obtained during a previous consult. It was updated today, and is scanned under the  Media  tab.     No new significant findings were reported by Citlalli today.     Discussed in previous consult: Citlalli's father has rheumatoid arthritis.     Otherwise, the  reported family history is negative for multiple miscarriages, stillbirths, birth defects, intellectual disability, known genetic conditions, and consanguinity.       Carrier Screening:   The patient reports that she and the father of the pregnancy have  ancestry:      Cystic fibrosis is an autosomal recessive genetic condition that occurs with increased frequency in individuals of  ancestry and carrier screening for this condition is available.  In addition,  screening in the [facility] includes cystic fibrosis.      Expanded carrier screening for mutations in a large panel of genes associated with autosomal recessive conditions including cystic fibrosis, spinal muscular atrophy, and others, is now available.      The patient has declined the carrier screening options reviewed today.       Risk Assessment for Chromosome Conditions:   We explained that the risk for fetal chromosome abnormalities increases with maternal age. We discussed specific features of common chromosome abnormalities, including Down syndrome, trisomy 13, trisomy 18, and sex chromosome trisomies.      - At age 28 at midtrimester, the risk to have a baby with Down syndrome is 1 in 690.     - At age 28 at midtrimester, the risk to have a baby with any chromosome abnormality is 1 in 345.          Testing Options:   We discussed the following options:   First trimester screening    First trimester ultrasound with nuchal translucency and nasal bone assessments, maternal plasma hCG, NATHALY-A, and AFP measurement    Screens for fetal trisomy 21, trisomy 13, and trisomy 18    Cannot screen for open neural tube defects; maternal serum AFP after 15 weeks is recommended       Quad screen:     Maternal plasma AFP, hCG, estriol, and inhibin measurement between 15-24 weeks gestation    Provides risk assessment for fetal Down syndrome, trisomy 18, and neural tube defects     Genetic Amniocentesis    Invasive procedure typically  performed in the second trimester by which amniotic fluid is obtained for the purpose of chromosome analysis and/or other prenatal genetic analysis    Diagnostic results; >99% sensitivity for fetal chromosome abnormalities    AFAFP measurement tests for open neural tube defects        We reviewed the benefits and limitations of this testing.  Screening tests provide a risk assessment specific to the pregnancy for certain fetal chromosome abnormalities, but cannot definitively diagnose or exclude a fetal chromosome abnormality.  Follow-up genetic counseling and consideration of diagnostic testing is recommended with any abnormal screening result.      It was a pleasure to be involved with Citlalli [facility]. Face-to-face time of the meeting was 20 minutes.    Cassandra Jarvis [facility]  Certified Genetic Counselor  [phone]    Patient seen, evaluated and discussed with the Genetic Counseling Intern. I have verified the content of the note, which accurately reflects my assessment of the patient and the plan of care.  Supervising Genetic Counselor  Aleisha Jain MS, [facility]  Maternal Fetal Medicine  [facility]  Phone: [phone]  Email: [email or web address]

## 2018-10-05 NOTE — MR AVS SNAPSHOT
"              After Visit Summary   10/5/2018    Citlalli Martin    MRN: [id number]           Patient Information     Date Of Birth          1990        Visit Information        Provider Department      10/5/2018 10:15 AM UR GEN COUNSELOR 2 [facility] Maternal Fetal Medicine [facility]        Today's Diagnoses     First trimester screening    -  1    Pregnancy related condition, antepartum           Follow-ups after your visit        Your next 10 appointments already scheduled     Oct 09, 2018  9:30 AM CDT   ESTABLISHED PRENATAL with [facility]ta Em MD   [facility]    [address] [place] [id number] [phone]              Who to contact     If you have questions or need follow up information about today's clinic visit or your schedule please contact [facility] MATERNAL FETAL MEDICINE [facility] directly at [phone].  Normal or non-critical lab and imaging results will be communicated to you by MyChart, letter or phone within 4 business days after the clinic has received the results. If you do not hear from us within 7 days, please contact the clinic through [employer] or phone. If you have a critical or abnormal lab result, we will notify you by phone as soon as possible.  Submit refill requests through [employer] or call your pharmacy and they will forward the refill request to us. Please allow 3 business days for your refill to be completed.          Additional Information About Your Visit        MyChart Information     [employer] lets you send messages to your doctor, view your test results, renew your prescriptions, schedule appointments and more. To sign up, go to [email or web address] . Click on \"Log in\" on the left side of the screen, which will take you to the Welcome page. Then click on \"Sign up Now\" on the right side of the page.     You will be asked to enter the access code listed below, as well as some personal " information. Please follow the directions to create your username and password.     Your access code is: [email or web address]  Expires: 2018 12:24 PM     Your access code will  in 90 days. If you need help or a new code, please call your [facility] clinic or [phone].        Care EveryWhere ID     This is your Care EveryWhere ID. This could be used by other organizations to access your [facility] medical records  [id number]        Your Vitals Were     Last Period                   2018            Blood Pressure from Last 3 Encounters:   18 115/87   18 115/81   17 113/80    Weight from Last 3 Encounters:   18 55.2 kg (121 lb 12.8 oz)   18 55.2 kg (121 lb 12.8 oz)   17 64.1 kg (141 lb 4.8 oz)              We Performed the Following     [facility] Genetic Counseling        Primary Care Provider Fax #    Physician No Ref-Primary [phone]       No address on file        Equal Access to Services     [facility]: Hadii vahid song hadasho Soomaali, waaxda luqadaha, qaybta kaalmada adeegyaruss, haley pemberton . So [facility] [phone].    ATENCIÓN: Si habla español, tiene a cat disposición servicios gratuitos de asistencia lingüística. Llame al [phone].    We comply with applicable federal civil rights laws and [place] laws. We do not discriminate on the basis of race, color, national origin, age, disability, sex, sexual orientation, or gender identity.            Thank you!     Thank you for choosing [facility]  for your care. Our goal is always to provide you with excellent care. Hearing back from our patients is one way we can continue to improve our services. Please take a few minutes to complete the written survey that you may receive in the mail after your visit with us. Thank you!             Your Updated Medication List - Protect others around you: Learn how to safely use, store and throw away your medicines at  www.disposemymeds.org.          This list is accurate as of 10/5/18 11:57 AM.  Always use your most recent med list.                   Brand Name Dispense Instructions for use Diagnosis    AZATHIOPRINE PO      Take 125 mg by mouth 2 times daily        ondansetron 4 MG tablet    ZOFRAN    60 tablet    Take 1 tablet (4 mg) by mouth every 8 hours as needed for nausea    Nausea and vomiting in pregnancy       prenatal multivitamin plus iron 27-0.8 MG Tabs per tablet      Take 1 tablet by mouth daily

## 2018-10-05 NOTE — MR AVS SNAPSHOT
"              After Visit Summary   10/5/2018    Citlalli Martin    MRN: [id number]           Patient Information     Date Of Birth          1990        Visit Information        Provider Department      10/5/2018 11:30 AM Alanna Wylie MD [facility] Maternal Fetal Medicine [facility]        Today's Diagnoses     First trimester screening           Follow-ups after your visit        Your next 10 appointments already scheduled     Oct 09, 2018  9:30 AM CDT   ESTABLISHED PRENATAL with Etta Em MD   [facility] ([facility]    [address] [place] [id number] [phone]              Who to contact     If you have questions or need follow up information about today's clinic visit or your schedule please contact [facility] MATERNAL FETAL [facility] directly at [phone].  Normal or non-critical lab and imaging results will be communicated to you by MyChart, letter or phone within 4 business days after the clinic has received the results. If you do not hear from us within 7 days, please contact the clinic through MyChart or phone. If you have a critical or abnormal lab result, we will notify you by phone as soon as possible.  Submit refill requests through [employer] or call your pharmacy and they will forward the refill request to us. Please allow 3 business days for your refill to be completed.          Additional Information About Your Visit        MyChart Information     [employer] lets you send messages to your doctor, view your test results, renew your prescriptions, schedule appointments and more. To sign up, go to [email or web address] . Click on \"Log in\" on the left side of the screen, which will take you to the Welcome page. Then click on \"Sign up Now\" on the right side of the page.     You will be asked to enter the access code listed below, as well as some personal information. Please follow the directions to create " your username and password.     Your access code is: [email or web address]  Expires: 2018 12:24 PM     Your access code will  in 90 days. If you need help or a new code, please call your [facility] or [phone].        Care EveryWhere ID     This is your Care EveryWhere ID. This could be used by other organizations to access your [facility] medical records  [id number]        Your Vitals Were     Last Period                   2018            Blood Pressure from Last 3 Encounters:   18 115/87   18 115/81   17 113/80    Weight from Last 3 Encounters:   18 55.2 kg (121 lb 12.8 oz)   18 55.2 kg (121 lb 12.8 oz)   17 64.1 kg (141 lb 4.8 oz)              We Performed the Following     First Trimester Screen Biochem Markers        Primary Care Provider Fax #    Physician No Ref-Primary [phone]       No address on file        Equal Access to Services     [facility] : Hadii vahid song hadasho Soomaali, waaxda luqadaha, qaybta kaalmada adeegyada, haley pemberton . So [facility] [phone].    ATENCIÓN: Si habla español, tiene a cat disposición servicios gratuitos de asistencia lingüística. Llame al [phone].    We comply with applicable federal civil rights laws and [place] laws. We do not discriminate on the basis of race, color, national origin, age, disability, sex, sexual orientation, or gender identity.            Thank you!     Thank you for choosing [facility]  for your care. Our goal is always to provide you with excellent care. Hearing back from our patients is one way we can continue to improve our services. Please take a few minutes to complete the written survey that you may receive in the mail after your visit with us. Thank you!             Your Updated Medication List - Protect others around you: Learn how to safely use, store and throw away your medicines at www.disposemymeds.org.          This list  is accurate as of 10/5/18  1:40 PM.  Always use your most recent med list.                   Brand Name Dispense Instructions for use Diagnosis    AZATHIOPRINE PO      Take 125 mg by mouth 2 times daily        ondansetron 4 MG tablet    ZOFRAN    60 tablet    Take 1 tablet (4 mg) by mouth every 8 hours as needed for nausea    Nausea and vomiting in pregnancy       prenatal multivitamin plus iron 27-0.8 MG Tabs per tablet      Take 1 tablet by mouth daily

## 2018-10-05 NOTE — PROGRESS NOTES
Please see ultrasound report under imaging tab for details on ultrasound performed today.    Alanna Wylie MD  , OB/GYN  Maternal-Fetal Medicine  [email or web address]  [phone] (Academic office)  [phone] (Pager)

## 2018-10-08 LAB — LAB SCANNED RESULT: NORMAL

## 2018-10-09 ENCOUNTER — PRENATAL OFFICE VISIT (OUTPATIENT)
Dept: OBGYN | Facility: CLINIC | Age: 28
End: 2018-10-09
Payer: COMMERCIAL

## 2018-10-09 ENCOUNTER — TELEPHONE (OUTPATIENT)
Dept: MATERNAL FETAL MEDICINE | Facility: CLINIC | Age: 28
End: 2018-10-09

## 2018-10-09 VITALS
HEART RATE: 80 BPM | SYSTOLIC BLOOD PRESSURE: 118 MMHG | TEMPERATURE: 97.6 F | WEIGHT: 125.5 LBS | BODY MASS INDEX: 20.26 KG/M2 | DIASTOLIC BLOOD PRESSURE: 84 MMHG

## 2018-10-09 DIAGNOSIS — O34.219 PREVIOUS CESAREAN DELIVERY, ANTEPARTUM CONDITION OR COMPLICATION: ICD-10-CM

## 2018-10-09 DIAGNOSIS — Z23 NEED FOR PROPHYLACTIC VACCINATION AND INOCULATION AGAINST INFLUENZA: Primary | ICD-10-CM

## 2018-10-09 PROCEDURE — 99207 ZZC PRENATAL VISIT: CPT | Performed by: OBSTETRICS & GYNECOLOGY

## 2018-10-09 PROCEDURE — 90471 IMMUNIZATION ADMIN: CPT | Performed by: OBSTETRICS & GYNECOLOGY

## 2018-10-09 PROCEDURE — 90686 IIV4 VACC NO PRSV 0.5 ML IM: CPT | Performed by: OBSTETRICS & GYNECOLOGY

## 2018-10-09 NOTE — PROGRESS NOTES

## 2018-10-09 NOTE — PROGRESS NOTES
14w2d Feeling much better, no c/o.  Starting to feel some flutters.  Normal first tri screen, plan AFP next visit- future order placed.  Will schedule survey.  Flu shot today.  RTC 4 weeks  concepcion

## 2018-10-09 NOTE — MR AVS SNAPSHOT
"              After Visit Summary   10/9/2018    Citlalli Martin    MRN: [id number]           Patient Information     Date Of Birth          1990        Visit Information        Provider Department      10/9/2018 9:30 AM Etta Em MD [facility]        Today's Diagnoses     Need for prophylactic vaccination and inoculation against influenza    -  1    Previous  delivery, antepartum condition or complication           Follow-ups after your visit        Future tests that were ordered for you today     Open Future Orders        Priority Expected Expires Ordered    US OB > 14 Weeks Complete Single Routine  10/9/2019 10/9/2018    Alpha fetoprotein maternal screen Routine  10/9/2019 10/9/2018            Who to contact     If you have questions or need follow up information about today's clinic visit or your schedule please contact [facility] directly at [phone].  Normal or non-critical lab and imaging results will be communicated to you by [employer], letter or phone within 4 business days after the clinic has received the results. If you do not hear from us within 7 days, please contact the clinic through [employer] or phone. If you have a critical or abnormal lab result, we will notify you by phone as soon as possible.  Submit refill requests through [employer] or call your pharmacy and they will forward the refill request to us. Please allow 3 business days for your refill to be completed.          Additional Information About Your Visit        MyChart Information     E[employer] lets you send messages to your doctor, view your test results, renew your prescriptions, schedule appointments and more. To sign up, go to [email or web address] . Click on \"Log in\" on the left side of the screen, which will take you to the Welcome page. Then click on \"Sign up Now\" on the right side of the page.     You will be asked to enter the access code listed below, as well as some " personal information. Please follow the directions to create your username and password.     Your access code is: [email or web address]  Expires: 2018 12:24 PM     Your access code will  in 90 days. If you need help or a new code, please call your [place] clinic or [phone].        Care EveryWhere ID     This is your Care EveryWhere ID. This could be used by other organizations to access your [place] medical records  [id number]        Your Vitals Were     Pulse Temperature Last Period BMI (Body Mass Index)          80 97.6  F (36.4  C) (Oral) 2018 20.26 kg/m2         Blood Pressure from Last 3 Encounters:   10/09/18 118/84   18 115/87   18 115/81    Weight from Last 3 Encounters:   10/09/18 125 lb 8 oz (56.9 kg)   18 121 lb 12.8 oz (55.2 kg)   18 121 lb 12.8 oz (55.2 kg)              We Performed the Following     FLU VACCINE, SPLIT VIRUS, IM (QUADRIVALENT) [70568]- >3 YRS     Vaccine Administration, Initial [78070]        Primary Care Provider Fax #    Physician No Ref-Primary [phone]       No address on file        Equal Access to Services     [facility] : Brandon ramoso Solaithali, waaxda luqadaha, qaybta kaalmada adeegyada, haley kelley. So [facility] [phone].    ATENCIÓN: Si habla español, tiene a cat disposición servicios gratuitos de asistencia lingüística. Llame al [phone].    We comply with applicable federal civil rights laws and [place] laws. We do not discriminate on the basis of race, color, national origin, age, disability, sex, sexual orientation, or gender identity.            Thank you!     Thank you for choosing [facility]  for your care. Our goal is always to provide you with excellent care. Hearing back from our patients is one way we can continue to improve our services. Please take a few minutes to complete the written survey that you may receive in the mail after your visit with us. Thank  you!             Your Updated Medication List - Protect others around you: Learn how to safely use, store and throw away your medicines at www.disposemymeds.org.          This list is accurate as of 10/9/18 10:21 AM.  Always use your most recent med list.                   Brand Name Dispense Instructions for use Diagnosis    AZATHIOPRINE PO      Take 125 mg by mouth 2 times daily        ondansetron 4 MG tablet    ZOFRAN    60 tablet    Take 1 tablet (4 mg) by mouth every 8 hours as needed for nausea    Nausea and vomiting in pregnancy       prenatal multivitamin plus iron 27-0.8 MG Tabs per tablet      Take 1 tablet by mouth daily

## 2018-10-11 NOTE — TELEPHONE ENCOUNTER
10/9/18    Left message for Citlalli regarding her first trimester screening results. I did not get permission to leave detailed results in a voicemail. I left my direct number and asked for a call back.     10/11/18    Left second voicemail for Citlalli regarding results. Asked for a call back. Will try again tomorrow.

## 2018-11-12 ENCOUNTER — RADIANT APPOINTMENT (OUTPATIENT)
Dept: ULTRASOUND IMAGING | Facility: CLINIC | Age: 28
End: 2018-11-12
Attending: OBSTETRICS & GYNECOLOGY
Payer: COMMERCIAL

## 2018-11-12 ENCOUNTER — PRENATAL OFFICE VISIT (OUTPATIENT)
Dept: OBGYN | Facility: CLINIC | Age: 28
End: 2018-11-12
Attending: OBSTETRICS & GYNECOLOGY
Payer: COMMERCIAL

## 2018-11-12 VITALS
BODY MASS INDEX: 20.73 KG/M2 | OXYGEN SATURATION: 100 % | SYSTOLIC BLOOD PRESSURE: 125 MMHG | HEIGHT: 66 IN | DIASTOLIC BLOOD PRESSURE: 81 MMHG | HEART RATE: 81 BPM | WEIGHT: 129 LBS

## 2018-11-12 DIAGNOSIS — O34.219 PREVIOUS CESAREAN DELIVERY, ANTEPARTUM CONDITION OR COMPLICATION: ICD-10-CM

## 2018-11-12 DIAGNOSIS — O34.219 PREVIOUS CESAREAN DELIVERY, ANTEPARTUM CONDITION OR COMPLICATION: Primary | ICD-10-CM

## 2018-11-12 DIAGNOSIS — Z23 NEED FOR PROPHYLACTIC VACCINATION AND INOCULATION AGAINST INFLUENZA: ICD-10-CM

## 2018-11-12 PROCEDURE — 99207 ZZC PRENATAL VISIT: CPT | Performed by: OBSTETRICS & GYNECOLOGY

## 2018-11-12 PROCEDURE — 76805 OB US >/= 14 WKS SNGL FETUS: CPT | Performed by: OBSTETRICS & GYNECOLOGY

## 2018-11-12 NOTE — PROGRESS NOTES
Had survey ultrasound today, reassuring.  AFP today.  Using Tums, will also plan zantac if needed.  Will do one hour glucose next.  RTC 5 weeks.  AM

## 2018-11-12 NOTE — MR AVS SNAPSHOT
After Visit Summary   2018    Citlalli Martin    MRN: [id number]           Patient Information     Date Of Birth          1990        Visit Information        Provider Department      2018 10:30 AM Maryam Christianson MD [facility]        Today's Diagnoses     Previous  delivery, antepartum condition or complication    -  1       Follow-ups after your visit        Your next 10 appointments already scheduled     2018 10:30 AM CST   ESTABLISHED PRENATAL with Maryam Christianson MD   [facility]    [address] [place] [id number] [phone]            Dec 21, 2018  9:15 AM CST   LAB with RD LAB   [facility]    [address] [place] [id number] [phone]           Please do not eat 10-12 hours before your appointment if you are coming in fasting for labs on lipids, cholesterol, or glucose (sugar). This does not apply to pregnant women. Water, hot tea and black coffee (with nothing added) are okay. Do not drink other fluids, diet soda or chew gum.            Dec 21, 2018  9:30 AM CST   ESTABLISHED PRENATAL with Etta Em MD   [facility]    [address] [place] [id number] [phone]              Who to contact     If you have questions or need follow up information about today's clinic visit or your schedule please contact [facility] directly at [phone].  Normal or non-critical lab and imaging results will be communicated to you by MyChart, letter or phone within 4 business days after the clinic has received the results. If you do not hear from us within 7 days, please contact the clinic through MyChart or phone. If you have a critical or abnormal lab result, we will notify you by phone as soon as possible.  Submit  "refill requests through [employer] or call your pharmacy and they will forward the refill request to us. Please allow 3 business days for your refill to be completed.          Additional Information About Your Visit        PaperKarmahart Information     [employer] lets you send messages to your doctor, view your test results, renew your prescriptions, schedule appointments and more. To sign up, go to [email or web address] . Click on \"Log in\" on the left side of the screen, which will take you to the Welcome page. Then click on \"Sign up Now\" on the right side of the page.     You will be asked to enter the access code listed below, as well as some personal information. Please follow the directions to create your username and password.     Your access code is: [email or web address]  Expires: 2018 11:24 AM     Your access code will  in 90 days. If you need help or a new code, please call your [place] clinic or [phone].        Care EveryWhere ID     This is your Care EveryWhere ID. This could be used by other organizations to access your [place] medical records  [id number]        Your Vitals Were     Pulse Height Last Period Pulse Oximetry Breastfeeding? BMI (Body Mass Index)    81 5' 6\" (1.676 m) 2018 100% No 20.82 kg/m2       Blood Pressure from Last 3 Encounters:   18 125/81   10/09/18 118/84   18 115/87    Weight from Last 3 Encounters:   18 129 lb (58.5 kg)   10/09/18 125 lb 8 oz (56.9 kg)   18 121 lb 12.8 oz (55.2 kg)              Today, you had the following     No orders found for display       Primary Care Provider Fax #    Physician No Ref-Primary [phone]       No address on file        Equal Access to Services     SRIRAM HANCOCK : Brandon Akbar, prudencio dewitt, qamaryjo kaalhaley pitts. So [facility] [phone].    ATENCIÓN: Si habla español, tiene a cat disposición servicios gratuitos de asistencia lingüística. Llame al " [phone].    We comply with applicable federal civil rights laws and [place] laws. We do not discriminate on the basis of race, color, national origin, age, disability, sex, sexual orientation, or gender identity.            Thank you!     Thank you for choosing [facility]  for your care. Our goal is always to provide you with excellent care. Hearing back from our patients is one way we can continue to improve our services. Please take a few minutes to complete the written survey that you may receive in the mail after your visit with us. Thank you!             Your Updated Medication List - Protect others around you: Learn how to safely use, store and throw away your medicines at www.disposemymeds.org.          This list is accurate as of 11/12/18 10:17 AM.  Always use your most recent med list.                   Brand Name Dispense Instructions for use Diagnosis    AZATHIOPRINE PO      Take 125 mg by mouth 2 times daily        ondansetron 4 MG tablet    ZOFRAN    60 tablet    Take 1 tablet (4 mg) by mouth every 8 hours as needed for nausea    Nausea and vomiting in pregnancy       prenatal multivitamin plus iron 27-0.8 MG Tabs per tablet      Take 1 tablet by mouth daily

## 2018-12-21 ENCOUNTER — TELEPHONE (OUTPATIENT)
Dept: OBGYN | Facility: CLINIC | Age: 28
End: 2018-12-21

## 2018-12-21 ENCOUNTER — PRENATAL OFFICE VISIT (OUTPATIENT)
Dept: OBGYN | Facility: CLINIC | Age: 28
End: 2018-12-21
Payer: COMMERCIAL

## 2018-12-21 VITALS
DIASTOLIC BLOOD PRESSURE: 73 MMHG | WEIGHT: 137.3 LBS | SYSTOLIC BLOOD PRESSURE: 115 MMHG | BODY MASS INDEX: 22.16 KG/M2 | HEART RATE: 83 BPM | TEMPERATURE: 97.5 F

## 2018-12-21 DIAGNOSIS — O34.219 PREVIOUS CESAREAN DELIVERY, ANTEPARTUM CONDITION OR COMPLICATION: Primary | ICD-10-CM

## 2018-12-21 LAB
GLUCOSE 1H P 50 G GLC PO SERPL-MCNC: 126 MG/DL (ref 60–129)
HGB BLD-MCNC: 11.1 G/DL (ref 11.7–15.7)

## 2018-12-21 PROCEDURE — 99207 ZZC PRENATAL VISIT: CPT | Performed by: OBSTETRICS & GYNECOLOGY

## 2018-12-21 PROCEDURE — 59425 ANTEPARTUM CARE ONLY: CPT | Performed by: OBSTETRICS & GYNECOLOGY

## 2018-12-21 PROCEDURE — 82950 GLUCOSE TEST: CPT | Performed by: OBSTETRICS & GYNECOLOGY

## 2018-12-21 PROCEDURE — 36415 COLL VENOUS BLD VENIPUNCTURE: CPT | Performed by: OBSTETRICS & GYNECOLOGY

## 2018-12-21 PROCEDURE — 00000218 ZZHCL STATISTIC OBHBG - HEMOGLOBIN: Performed by: OBSTETRICS & GYNECOLOGY

## 2018-12-21 ASSESSMENT — PATIENT HEALTH QUESTIONNAIRE - PHQ9: SUM OF ALL RESPONSES TO PHQ QUESTIONS 1-9: 0

## 2018-12-21 NOTE — PROGRESS NOTES
24w5d feeling ok, no c/o.  Good fm.  glucola today.  Discussed c/s date, I will be out of town, so will schedule with partner.  She remembers Dr. Prajapati from last regnancy, will try to coordinate with her schedule.  Tdap next visit.  RTC 4 weeks  kenp

## 2018-12-21 NOTE — LETTER
December 21, 2018      Citlalli Martin  [address] [place] [id number]        Dear ,    We are writing to inform you of your test results.    Your test results fall within the expected range(s) or remain unchanged from previous results.  Please continue with current treatment plan.    Resulted Orders   Glucose tolerance gest screen 1 hour   Result Value Ref Range    Glu Gest Screen 1hr 50g 126 60 - 129 mg/dL   OB hemoglobin   Result Value Ref Range    Hemoglobin 11.1 (L) 11.7 - 15.7 g/dL       If you have any questions or concerns, please call the clinic at the number listed above.       Sincerely,        Etta Em MD

## 2018-12-27 NOTE — TELEPHONE ENCOUNTER
Type of surgery: OB  Location of surgery: [facility]  Date and time of surgery: 4/3/19 1030a  Surgeon: Gokul  Pre-Op Appt Date: tbd  Post-Op Appt Date: 6 weeks   Packet sent out: Yes  Pre-cert/Authorization completed:  No  Date: 12/27/2018    Karime WOMACK, Surgery Coordinator

## 2019-01-18 ENCOUNTER — NURSE TRIAGE (OUTPATIENT)
Dept: NURSING | Facility: CLINIC | Age: 29
End: 2019-01-18

## 2019-01-18 ENCOUNTER — TELEPHONE (OUTPATIENT)
Dept: OBGYN | Facility: CLINIC | Age: 29
End: 2019-01-18

## 2019-01-18 DIAGNOSIS — J01.00 SUBACUTE MAXILLARY SINUSITIS: Primary | ICD-10-CM

## 2019-01-18 RX ORDER — AZITHROMYCIN 250 MG/1
TABLET, FILM COATED ORAL
Qty: 6 TABLET | Refills: 0 | Status: SHIPPED | OUTPATIENT
Start: 2019-01-18 | End: 2019-02-19

## 2019-01-18 NOTE — TELEPHONE ENCOUNTER
Would you be willing to treat her with z-pack or do you want her evaluated by primary care. We do not have openings with ours until Tuesday next week. Pharmacy teed up if needed.  Promise Victor

## 2019-01-18 NOTE — TELEPHONE ENCOUNTER
"Clinic Action Needed:Yes, Please call patient at   Reason for Call:  \"I am pretty sure I have a sinus infection.\" Symptoms starting 1 week ago with a \"cold.\" Facial pain starting in past 3 days. Afebrile. Pain waking from sleep. Reporting right cheek pain. Denies redness or swelling. Patient is taking Tylenol intermittently for pain.   Patient is questioning if her OB/GYN would prescribe anything for sinus infection? Stating she is on immunosuppressing medication. Reports she does not have a primary MD and prefers not to go into Urgent Care.     Per guidelines home care advice given. Patient prefers message to MD.     Caller verbalized understanding. Denies further questions.    Alanna Paulson RN  [facility]    Additional Information    Negative: Severe difficulty breathing (e.g., struggling for each breath, speaks in single words)    Negative: Sounds like a life-threatening emergency to the triager    Negative: [1] Sinus infection AND [2] taking an antibiotic AND [3] symptoms continue    Negative: [1] Difficulty breathing AND [2] not from stuffy nose (e.g., not relieved by cleaning out the nose)    Negative: [1] SEVERE headache AND [2] fever    Negative: [1] Redness or swelling on the cheek, forehead or around the eye AND [2] fever    Negative: Fever > 104 F (40 C)    Negative: Patient sounds very sick or weak to the triager    Negative: [1] SEVERE pain AND [2] not improved 2 hours after pain medicine    Negative: [1] Redness or swelling on the cheek, forehead or around the eye AND [2] no fever    Negative: [1] Fever > 101 F (38.3 C) AND [2] age > 60    Negative: [1] Fever > 101 F (38.3 C) AND [2] bedridden (e.g., nursing home patient, CVA, chronic illness, recovering from surgery)    Negative: [1] Fever > 100.5 F (38.1 C) AND [2] diabetes mellitus or weak immune system (e.g., HIV positive, cancer chemo, splenectomy, organ transplant, chronic steroids)    Negative: Fever present > 3 days (72 " hours)    Negative: [1] Fever returns after gone for over 24 hours AND [2] symptoms worse or not improved    Negative: [1] Sinus pain (not just congestion) AND [2] fever    Negative: Earache    Negative: [1] Sinus congestion (pressure, fullness) AND [2] present > 10 days    Negative: [1] Nasal discharge AND [2] present > 10 days    Negative: [1] Using nasal washes and pain medicine > 24 hours AND [2] sinus pain (around cheekbone or eye) persists    Negative: Lots of coughing    [1] Sinus congestion as part of a cold AND [2] present < 10 days (all triage questions negative)    Protocols used: SINUS PAIN OR CONGESTION-ADULT-

## 2019-01-18 NOTE — TELEPHONE ENCOUNTER
"Clinic Action Needed:Yes, Please call patient at   Reason for Call:  \"I am pretty sure I have a sinus infection.\" Symptoms starting 1 week ago with a \"cold.\" Facial pain starting in past 3 days. Afebrile. Pain waking from sleep. Reporting right cheek pain. Denies redness or swelling. Patient is taking Tylenol intermittent.   Patient is questioning if her OB/GYN would prescribe anything for sinus infection. Stating she is on immunosuppressing medication. Reports she does not have a primary MD and prefers not to go into Urgent Care.     Per guidelines home care advice given. Patient prefers message to MD.     Caller verbalized understanding. Denies further questions.    Alanna Paulson RN  [facility]      Additional Information    Negative: Severe difficulty breathing (e.g., struggling for each breath, speaks in single words)    Negative: Sounds like a life-threatening emergency to the triager    Negative: [1] Sinus infection AND [2] taking an antibiotic AND [3] symptoms continue    Negative: [1] Difficulty breathing AND [2] not from stuffy nose (e.g., not relieved by cleaning out the nose)    Negative: [1] SEVERE headache AND [2] fever    Negative: [1] Redness or swelling on the cheek, forehead or around the eye AND [2] fever    Negative: Fever > 104 F (40 C)    Negative: Patient sounds very sick or weak to the triager    Negative: [1] SEVERE pain AND [2] not improved 2 hours after pain medicine    Negative: [1] Redness or swelling on the cheek, forehead or around the eye AND [2] no fever    Negative: [1] Fever > 101 F (38.3 C) AND [2] age > 60    Negative: [1] Fever > 101 F (38.3 C) AND [2] bedridden (e.g., nursing home patient, CVA, chronic illness, recovering from surgery)    Negative: [1] Fever > 100.5 F (38.1 C) AND [2] diabetes mellitus or weak immune system (e.g., HIV positive, cancer chemo, splenectomy, organ transplant, chronic steroids)    Negative: Fever present > 3 days (72 " hours)    Negative: [1] Fever returns after gone for over 24 hours AND [2] symptoms worse or not improved    Negative: [1] Sinus pain (not just congestion) AND [2] fever    Negative: Earache    Negative: [1] Sinus congestion (pressure, fullness) AND [2] present > 10 days    Negative: [1] Nasal discharge AND [2] present > 10 days    Negative: [1] Using nasal washes and pain medicine > 24 hours AND [2] sinus pain (around cheekbone or eye) persists    Negative: Lots of coughing    [1] Sinus congestion as part of a cold AND [2] present < 10 days (all triage questions negative)    Protocols used: SINUS PAIN OR CONGESTION-ADULT-

## 2019-01-23 ENCOUNTER — PRENATAL OFFICE VISIT (OUTPATIENT)
Dept: OBGYN | Facility: CLINIC | Age: 29
End: 2019-01-23
Payer: COMMERCIAL

## 2019-01-23 VITALS
DIASTOLIC BLOOD PRESSURE: 75 MMHG | TEMPERATURE: 96.7 F | WEIGHT: 145 LBS | HEART RATE: 89 BPM | BODY MASS INDEX: 23.4 KG/M2 | SYSTOLIC BLOOD PRESSURE: 116 MMHG

## 2019-01-23 DIAGNOSIS — O34.219 PREVIOUS CESAREAN DELIVERY, ANTEPARTUM CONDITION OR COMPLICATION: Primary | ICD-10-CM

## 2019-01-23 PROCEDURE — 90471 IMMUNIZATION ADMIN: CPT | Performed by: OBSTETRICS & GYNECOLOGY

## 2019-01-23 PROCEDURE — 99207 ZZC PRENATAL VISIT: CPT | Performed by: OBSTETRICS & GYNECOLOGY

## 2019-01-23 PROCEDURE — 90715 TDAP VACCINE 7 YRS/> IM: CPT | Performed by: OBSTETRICS & GYNECOLOGY

## 2019-01-23 RX ORDER — BREAST PUMP
1 EACH MISCELLANEOUS PRN
Qty: 1 EACH | Refills: 0 | Status: SHIPPED | OUTPATIENT
Start: 2019-01-23 | End: 2020-01-02

## 2019-01-23 NOTE — PROGRESS NOTES
29w3d feeling good, no c/o.  Just getting over sinus infection.  Good fm.  Has c/s scheduled while I am on vacation, wondering if she can wait until I return, which will be 40+2.  I explained that is no problem, just needs to know she may go into labor or ROM prior to that, but c/s would then just be done when she comes in.  She feels ok with that, her  is more concerned, so they will discuss and let me know.  May do next visit with Dr. Hodgson who it is currently scheduled with. tdap today.  RTC 2 weeks  concepcion

## 2019-02-19 ENCOUNTER — PRENATAL OFFICE VISIT (OUTPATIENT)
Dept: OBGYN | Facility: CLINIC | Age: 29
End: 2019-02-19
Payer: COMMERCIAL

## 2019-02-19 VITALS
SYSTOLIC BLOOD PRESSURE: 115 MMHG | BODY MASS INDEX: 24.61 KG/M2 | DIASTOLIC BLOOD PRESSURE: 75 MMHG | HEART RATE: 92 BPM | WEIGHT: 152.5 LBS

## 2019-02-19 DIAGNOSIS — O34.219 PREVIOUS CESAREAN DELIVERY, ANTEPARTUM CONDITION OR COMPLICATION: Primary | ICD-10-CM

## 2019-02-19 PROCEDURE — 99207 ZZC PRENATAL VISIT: CPT | Performed by: OBSTETRICS & GYNECOLOGY

## 2019-02-19 NOTE — PROGRESS NOTES
33w2d feeling ok, no complaints other than getting tired.  Good fm.  Still measuring small normal, will get growth with next visit.  Decided to keep c/s at 39wks.  RTC 2 weeks  concepcion

## 2019-03-07 ENCOUNTER — PRENATAL OFFICE VISIT (OUTPATIENT)
Dept: OBGYN | Facility: CLINIC | Age: 29
End: 2019-03-07
Attending: OBSTETRICS & GYNECOLOGY
Payer: COMMERCIAL

## 2019-03-07 ENCOUNTER — ANCILLARY PROCEDURE (OUTPATIENT)
Dept: ULTRASOUND IMAGING | Facility: CLINIC | Age: 29
End: 2019-03-07
Attending: OBSTETRICS & GYNECOLOGY
Payer: COMMERCIAL

## 2019-03-07 VITALS
SYSTOLIC BLOOD PRESSURE: 118 MMHG | WEIGHT: 153.5 LBS | BODY MASS INDEX: 24.78 KG/M2 | HEART RATE: 88 BPM | DIASTOLIC BLOOD PRESSURE: 74 MMHG

## 2019-03-07 DIAGNOSIS — O34.219 PREVIOUS CESAREAN DELIVERY, ANTEPARTUM CONDITION OR COMPLICATION: Primary | ICD-10-CM

## 2019-03-07 DIAGNOSIS — O34.219 PREVIOUS CESAREAN DELIVERY, ANTEPARTUM CONDITION OR COMPLICATION: ICD-10-CM

## 2019-03-07 PROCEDURE — 99207 ZZC PRENATAL VISIT: CPT | Performed by: OBSTETRICS & GYNECOLOGY

## 2019-03-07 PROCEDURE — 76816 OB US FOLLOW-UP PER FETUS: CPT | Performed by: OBSTETRICS & GYNECOLOGY

## 2019-03-07 NOTE — PROGRESS NOTES
35w4d feeling good, no v/o.  Good fm.  Had growth today, 64%tile, normal KANNAN.  6lb.  C/s date changed to 4/5 due to better childcare, discussed pp stay and discharge day 2.  GBS next visit.  RTC weekly  kenp

## 2019-03-25 ENCOUNTER — PRENATAL OFFICE VISIT (OUTPATIENT)
Dept: OBGYN | Facility: CLINIC | Age: 29
End: 2019-03-25
Payer: COMMERCIAL

## 2019-03-25 VITALS
HEART RATE: 91 BPM | WEIGHT: 159.3 LBS | BODY MASS INDEX: 25.71 KG/M2 | DIASTOLIC BLOOD PRESSURE: 80 MMHG | SYSTOLIC BLOOD PRESSURE: 125 MMHG

## 2019-03-25 DIAGNOSIS — O34.219 PREVIOUS CESAREAN DELIVERY, ANTEPARTUM CONDITION OR COMPLICATION: Primary | ICD-10-CM

## 2019-03-25 LAB — HGB BLD-MCNC: 11 G/DL (ref 11.7–15.7)

## 2019-03-25 PROCEDURE — 87653 STREP B DNA AMP PROBE: CPT | Performed by: OBSTETRICS & GYNECOLOGY

## 2019-03-25 PROCEDURE — 59425 ANTEPARTUM CARE ONLY: CPT | Performed by: OBSTETRICS & GYNECOLOGY

## 2019-03-25 PROCEDURE — 00000218 ZZHCL STATISTIC OBHBG - HEMOGLOBIN: Performed by: OBSTETRICS & GYNECOLOGY

## 2019-03-25 PROCEDURE — 36416 COLLJ CAPILLARY BLOOD SPEC: CPT | Performed by: OBSTETRICS & GYNECOLOGY

## 2019-03-25 PROCEDURE — 99207 ZZC PRENATAL VISIT: CPT | Performed by: OBSTETRICS & GYNECOLOGY

## 2019-03-25 NOTE — LETTER
March 26, 2019      Citlalli Martin  [id number] [facility] [id number]        Dear ,    We are writing to inform you of your test results.    Your test results fall within the expected range(s) or remain unchanged from previous results.  Please continue with current treatment plan.    Resulted Orders   OB hemoglobin   Result Value Ref Range    Hemoglobin 11.0 (L) 11.7 - 15.7 g/dL   Group B strep PCR   Result Value Ref Range    Group B Strep PCR Spec Fredis Vaginal Rectal     Group B Strep PCR Negative NEG^Negative      Comment:      Assay performed on incubated broth culture of specimen using [employer] real-time   PCR.         If you have any questions or concerns, please call the clinic at the number listed above.       Sincerely,        Etta Em MD

## 2019-03-25 NOTE — PROGRESS NOTES
38w1d feeling ok, no c/o.  Good fm.  Some ctx, nothing painful or regular.  C/s scheduled 4/5, discussed.  GBS today.  RTC weekly  kenp

## 2019-03-26 LAB
GP B STREP DNA SPEC QL NAA+PROBE: NEGATIVE
SPECIMEN SOURCE: NORMAL

## 2019-03-27 DIAGNOSIS — O34.219 PREVIOUS CESAREAN DELIVERY, ANTEPARTUM CONDITION OR COMPLICATION: ICD-10-CM

## 2019-03-27 DIAGNOSIS — Z01.818 PRE-OP EXAM: Primary | ICD-10-CM

## 2019-04-03 DIAGNOSIS — O34.219 PREVIOUS CESAREAN DELIVERY, ANTEPARTUM CONDITION OR COMPLICATION: ICD-10-CM

## 2019-04-03 DIAGNOSIS — Z01.818 PRE-OP EXAM: ICD-10-CM

## 2019-04-03 LAB
ABO + RH BLD: NORMAL
ABO + RH BLD: NORMAL
BLD GP AB SCN SERPL QL: NORMAL
BLOOD BANK CMNT PATIENT-IMP: NORMAL
ERYTHROCYTE [DISTWIDTH] IN BLOOD BY AUTOMATED COUNT: 13.1 % (ref 10–15)
HCT VFR BLD AUTO: 33 % (ref 35–47)
HGB BLD-MCNC: 11.1 G/DL (ref 11.7–15.7)
MCH RBC QN AUTO: 29.5 PG (ref 26.5–33)
MCHC RBC AUTO-ENTMCNC: 33.6 G/DL (ref 31.5–36.5)
MCV RBC AUTO: 88 FL (ref 78–100)
PLATELET # BLD AUTO: 173 10E9/L (ref 150–450)
RBC # BLD AUTO: 3.76 10E12/L (ref 3.8–5.2)
SPECIMEN EXP DATE BLD: NORMAL
WBC # BLD AUTO: 10.5 10E9/L (ref 4–11)

## 2019-04-03 PROCEDURE — 0064U ANTB TP TOTAL&RPR IA QUAL: CPT | Performed by: OBSTETRICS & GYNECOLOGY

## 2019-04-03 PROCEDURE — 86900 BLOOD TYPING SEROLOGIC ABO: CPT | Performed by: OBSTETRICS & GYNECOLOGY

## 2019-04-03 PROCEDURE — 85027 COMPLETE CBC AUTOMATED: CPT | Performed by: OBSTETRICS & GYNECOLOGY

## 2019-04-03 PROCEDURE — 86850 RBC ANTIBODY SCREEN: CPT | Performed by: OBSTETRICS & GYNECOLOGY

## 2019-04-03 PROCEDURE — 36415 COLL VENOUS BLD VENIPUNCTURE: CPT | Performed by: OBSTETRICS & GYNECOLOGY

## 2019-04-03 PROCEDURE — 86901 BLOOD TYPING SEROLOGIC RH(D): CPT | Performed by: OBSTETRICS & GYNECOLOGY

## 2019-04-04 ENCOUNTER — ANESTHESIA EVENT (OUTPATIENT)
Dept: OBGYN | Facility: CLINIC | Age: 29
End: 2019-04-04
Payer: COMMERCIAL

## 2019-04-04 LAB — T PALLIDUM AB SER QL: NONREACTIVE

## 2019-04-04 NOTE — ANESTHESIA PREPROCEDURE EVALUATION
Anesthesia Pre-Procedure Evaluation    Patient: Citlalli Martin   MRN:     [id number] Gender:   female   Age:    28 year old :      1990        Preoperative Diagnosis: Previous   Procedure(s):  REPEAT  SECTION     Past Medical History:   Diagnosis Date     Crohn's disease (H)      Depression, major      Lupus       Past Surgical History:   Procedure Laterality Date     APPENDECTOMY      with resection for Crohns      SECTION N/A 2016    Procedure:  SECTION;  Surgeon: Etta Em MD;  Location: [facility] L+D     SMALL BOWEL RESECTION      related to Crohns          Anesthesia Evaluation     . Pt has had prior anesthetic. Type: General and Regional    No history of anesthetic complications          ROS/MED HX    ENT/Pulmonary:  - neg pulmonary ROS     Neurologic:  - neg neurologic ROS     Cardiovascular:         METS/Exercise Tolerance:  >4 METS   Hematologic:     (+) Anemia, -      Musculoskeletal: Comment: Lupus        GI/Hepatic:     (+) Other GI/Hepatic Crohns Disease, s/p small and large bowel resection in ,currently azathioprine      Renal/Genitourinary:  - ROS Renal section negative       Endo:  - neg endo ROS       Psychiatric:     (+) psychiatric history depression      Infectious Disease:  - neg infectious disease ROS       Malignancy:      - no malignancy   Other:    (+) Possibly pregnant C-spine cleared: N/A, no H/O Chronic Pain,no other significant disability                        PHYSICAL EXAM:   Mental Status/Neuro: A/A/O   Airway: Facies: Feasible  Mallampati: III  Mouth/Opening: Full  TM distance: > 6 cm  Neck ROM: Full   Respiratory: Auscultation: CTAB     Resp. Rate: Normal     Resp. Effort: Normal      CV: Rhythm: Regular  Rate: Age appropriate  Heart: Normal Sounds   Comments:      Dental: Normal                  Lab Results   Component Value Date    WBC 10.5 2019    HGB 11.1 (L) 2019    HCT 33.0 (L) 2019     2019    CRP  "<2.9 09/03/2016    SED 25 (H) 08/29/2016     09/02/2016    POTASSIUM 3.7 09/03/2016    CHLORIDE 110 (H) 09/02/2016    CO2 24 09/02/2016    BUN 3 (L) 09/02/2016    CR 0.53 09/02/2016    GLC 78 09/02/2016    FANY 8.0 (L) 09/02/2016    PHOS 2.4 (L) 09/02/2016    MAG 1.6 09/03/2016    ALBUMIN 2.9 (L) 08/29/2016    PROTTOTAL 6.7 (L) 08/29/2016    ALT 17 08/29/2016    AST 18 08/29/2016    ALKPHOS 66 08/29/2016    BILITOTAL 0.1 (L) 08/29/2016    LIPASE 152 08/29/2016       Preop Vitals  BP Readings from Last 3 Encounters:   03/25/19 125/80   03/07/19 118/74   02/19/19 115/75    Pulse Readings from Last 3 Encounters:   03/25/19 91   03/07/19 88   02/19/19 92      Resp Readings from Last 3 Encounters:   11/25/16 16   09/03/16 18   04/14/16 14    SpO2 Readings from Last 3 Encounters:   11/12/18 100%   11/23/16 99%   11/16/16 100%      Temp Readings from Last 1 Encounters:   01/23/19 35.9  C (96.7  F) (Oral)    Ht Readings from Last 1 Encounters:   11/12/18 1.676 m (5' 6\")      Wt Readings from Last 1 Encounters:   03/25/19 72.3 kg (159 lb 4.8 oz)    Estimated body mass index is 25.71 kg/m  as calculated from the following:    Height as of 11/12/18: 1.676 m (5' 6\").    Weight as of 3/25/19: 72.3 kg (159 lb 4.8 oz).     LDA:            Assessment:   ASA SCORE: 2       Documentation: H&P complete; Preop Testing complete; Consents complete   Proceeding: Proceed without further delay  Tobacco Use:  NO Active use of Tobacco/UNKNOWN Tobacco use status     Plan:   Anes. Type:  Regional     RA-Location/Type: Spinal   Pre-Induction: Acetaminophen PO   Induction:  IV (Standard)      Access/Monitoring: PIV   Maintenance: Balanced   Emergence: Procedure Site   Logistics: Same Day Surgery     Postop Pain/Sedation Strategy:  Standard-Options: Opioids PRN     PONV Management:  Adult Risk Factors: Female, Non-Smoker, Postop Opioids  Prevention: Ondansetron     CONSENT: Direct conversation   Plan and risks discussed with: Patient    "                         Mirtha Erwin MD

## 2019-04-05 ENCOUNTER — HOSPITAL ENCOUNTER (INPATIENT)
Facility: CLINIC | Age: 29
LOS: 3 days | Discharge: HOME | End: 2019-04-08
Attending: OBSTETRICS & GYNECOLOGY | Admitting: OBSTETRICS & GYNECOLOGY
Payer: COMMERCIAL

## 2019-04-05 ENCOUNTER — ANESTHESIA (OUTPATIENT)
Dept: OBGYN | Facility: CLINIC | Age: 29
End: 2019-04-05
Payer: COMMERCIAL

## 2019-04-05 DIAGNOSIS — Z98.891 STATUS POST C-SECTION: Primary | ICD-10-CM

## 2019-04-05 PROCEDURE — 25000131 ZZH RX MED GY IP 250 OP 636 PS 637: Performed by: STUDENT IN AN ORGANIZED HEALTH CARE EDUCATION/TRAINING PROGRAM

## 2019-04-05 PROCEDURE — C1765 ADHESION BARRIER: HCPCS | Performed by: OBSTETRICS & GYNECOLOGY

## 2019-04-05 PROCEDURE — 25800030 ZZH RX IP 258 OP 636: Performed by: STUDENT IN AN ORGANIZED HEALTH CARE EDUCATION/TRAINING PROGRAM

## 2019-04-05 PROCEDURE — 25000128 H RX IP 250 OP 636: Performed by: STUDENT IN AN ORGANIZED HEALTH CARE EDUCATION/TRAINING PROGRAM

## 2019-04-05 PROCEDURE — 12000001 ZZH R&B MED SURG/OB UMMC

## 2019-04-05 PROCEDURE — 37000009 ZZH ANESTHESIA TECHNICAL FEE, EACH ADDTL 15 MIN: Performed by: OBSTETRICS & GYNECOLOGY

## 2019-04-05 PROCEDURE — 40000170 ZZH STATISTIC PRE-PROCEDURE ASSESSMENT II: Performed by: OBSTETRICS & GYNECOLOGY

## 2019-04-05 PROCEDURE — 36000059 ZZH SURGERY LEVEL 3 EA 15 ADDTL MIN UMMC: Performed by: OBSTETRICS & GYNECOLOGY

## 2019-04-05 PROCEDURE — 25000125 ZZHC RX 250: Performed by: STUDENT IN AN ORGANIZED HEALTH CARE EDUCATION/TRAINING PROGRAM

## 2019-04-05 PROCEDURE — C9290 INJ, BUPIVACAINE LIPOSOME: HCPCS | Performed by: STUDENT IN AN ORGANIZED HEALTH CARE EDUCATION/TRAINING PROGRAM

## 2019-04-05 PROCEDURE — 37000008 ZZH ANESTHESIA TECHNICAL FEE, 1ST 30 MIN: Performed by: OBSTETRICS & GYNECOLOGY

## 2019-04-05 PROCEDURE — 27210794 ZZH OR GENERAL SUPPLY STERILE: Performed by: OBSTETRICS & GYNECOLOGY

## 2019-04-05 PROCEDURE — 36000057 ZZH SURGERY LEVEL 3 1ST 30 MIN - UMMC: Performed by: OBSTETRICS & GYNECOLOGY

## 2019-04-05 PROCEDURE — 25000128 H RX IP 250 OP 636: Performed by: OBSTETRICS & GYNECOLOGY

## 2019-04-05 PROCEDURE — 71000014 ZZH RECOVERY PHASE 1 LEVEL 2 FIRST HR: Performed by: OBSTETRICS & GYNECOLOGY

## 2019-04-05 PROCEDURE — 25800030 ZZH RX IP 258 OP 636: Performed by: OBSTETRICS & GYNECOLOGY

## 2019-04-05 PROCEDURE — 27110028 ZZH OR GENERAL SUPPLY NON-STERILE: Performed by: OBSTETRICS & GYNECOLOGY

## 2019-04-05 PROCEDURE — 25000132 ZZH RX MED GY IP 250 OP 250 PS 637: Performed by: STUDENT IN AN ORGANIZED HEALTH CARE EDUCATION/TRAINING PROGRAM

## 2019-04-05 PROCEDURE — 71000015 ZZH RECOVERY PHASE 1 LEVEL 2 EA ADDTL HR: Performed by: OBSTETRICS & GYNECOLOGY

## 2019-04-05 PROCEDURE — 59515 CESAREAN DELIVERY: CPT | Mod: GC | Performed by: OBSTETRICS & GYNECOLOGY

## 2019-04-05 PROCEDURE — 25000132 ZZH RX MED GY IP 250 OP 250 PS 637: Performed by: OBSTETRICS & GYNECOLOGY

## 2019-04-05 RX ORDER — NALOXONE HYDROCHLORIDE 0.4 MG/ML
.1-.4 INJECTION, SOLUTION INTRAMUSCULAR; INTRAVENOUS; SUBCUTANEOUS
Status: DISCONTINUED | OUTPATIENT
Start: 2019-04-05 | End: 2019-04-05 | Stop reason: HOSPADM

## 2019-04-05 RX ORDER — LANOLIN 100 %
OINTMENT (GRAM) TOPICAL
Status: DISCONTINUED | OUTPATIENT
Start: 2019-04-05 | End: 2019-04-08 | Stop reason: HOSPADM

## 2019-04-05 RX ORDER — KETOROLAC TROMETHAMINE 30 MG/ML
INJECTION, SOLUTION INTRAMUSCULAR; INTRAVENOUS PRN
Status: DISCONTINUED | OUTPATIENT
Start: 2019-04-05 | End: 2019-04-05

## 2019-04-05 RX ORDER — OXYCODONE HYDROCHLORIDE 5 MG/1
5-10 TABLET ORAL
Status: DISCONTINUED | OUTPATIENT
Start: 2019-04-05 | End: 2019-04-08 | Stop reason: HOSPADM

## 2019-04-05 RX ORDER — MORPHINE SULFATE 1 MG/ML
INJECTION, SOLUTION EPIDURAL; INTRATHECAL; INTRAVENOUS
Status: DISCONTINUED
Start: 2019-04-05 | End: 2019-04-05 | Stop reason: HOSPADM

## 2019-04-05 RX ORDER — AZATHIOPRINE 50 MG/1
100 TABLET ORAL EVERY 24 HOURS
Status: DISCONTINUED | OUTPATIENT
Start: 2019-04-05 | End: 2019-04-08 | Stop reason: HOSPADM

## 2019-04-05 RX ORDER — FENTANYL CITRATE 50 UG/ML
25-50 INJECTION, SOLUTION INTRAMUSCULAR; INTRAVENOUS
Status: DISCONTINUED | OUTPATIENT
Start: 2019-04-05 | End: 2019-04-05

## 2019-04-05 RX ORDER — DEXTROSE, SODIUM CHLORIDE, SODIUM LACTATE, POTASSIUM CHLORIDE, AND CALCIUM CHLORIDE 5; .6; .31; .03; .02 G/100ML; G/100ML; G/100ML; G/100ML; G/100ML
INJECTION, SOLUTION INTRAVENOUS CONTINUOUS
Status: DISCONTINUED | OUTPATIENT
Start: 2019-04-05 | End: 2019-04-08 | Stop reason: HOSPADM

## 2019-04-05 RX ORDER — NALBUPHINE HYDROCHLORIDE 10 MG/ML
2.5-5 INJECTION, SOLUTION INTRAMUSCULAR; INTRAVENOUS; SUBCUTANEOUS EVERY 6 HOURS PRN
Status: DISCONTINUED | OUTPATIENT
Start: 2019-04-05 | End: 2019-04-05

## 2019-04-05 RX ORDER — ONDANSETRON 2 MG/ML
INJECTION INTRAMUSCULAR; INTRAVENOUS PRN
Status: DISCONTINUED | OUTPATIENT
Start: 2019-04-05 | End: 2019-04-05

## 2019-04-05 RX ORDER — OXYTOCIN/0.9 % SODIUM CHLORIDE 30/500 ML
100 PLASTIC BAG, INJECTION (ML) INTRAVENOUS CONTINUOUS
Status: DISCONTINUED | OUTPATIENT
Start: 2019-04-05 | End: 2019-04-08 | Stop reason: HOSPADM

## 2019-04-05 RX ORDER — SODIUM CHLORIDE, SODIUM LACTATE, POTASSIUM CHLORIDE, CALCIUM CHLORIDE 600; 310; 30; 20 MG/100ML; MG/100ML; MG/100ML; MG/100ML
INJECTION, SOLUTION INTRAVENOUS CONTINUOUS
Status: DISCONTINUED | OUTPATIENT
Start: 2019-04-05 | End: 2019-04-05

## 2019-04-05 RX ORDER — CITRIC ACID/SODIUM CITRATE 334-500MG
30 SOLUTION, ORAL ORAL
Status: COMPLETED | OUTPATIENT
Start: 2019-04-05 | End: 2019-04-05

## 2019-04-05 RX ORDER — CEFAZOLIN SODIUM 2 G/100ML
2 INJECTION, SOLUTION INTRAVENOUS
Status: COMPLETED | OUTPATIENT
Start: 2019-04-05 | End: 2019-04-05

## 2019-04-05 RX ORDER — OXYTOCIN/0.9 % SODIUM CHLORIDE 30/500 ML
PLASTIC BAG, INJECTION (ML) INTRAVENOUS CONTINUOUS PRN
Status: DISCONTINUED | OUTPATIENT
Start: 2019-04-05 | End: 2019-04-05

## 2019-04-05 RX ORDER — SODIUM CHLORIDE, SODIUM LACTATE, POTASSIUM CHLORIDE, CALCIUM CHLORIDE 600; 310; 30; 20 MG/100ML; MG/100ML; MG/100ML; MG/100ML
INJECTION, SOLUTION INTRAVENOUS CONTINUOUS
Status: DISCONTINUED | OUTPATIENT
Start: 2019-04-05 | End: 2019-04-05 | Stop reason: HOSPADM

## 2019-04-05 RX ORDER — ONDANSETRON 2 MG/ML
4 INJECTION INTRAMUSCULAR; INTRAVENOUS EVERY 6 HOURS PRN
Status: DISCONTINUED | OUTPATIENT
Start: 2019-04-05 | End: 2019-04-08 | Stop reason: HOSPADM

## 2019-04-05 RX ORDER — MORPHINE SULFATE 1 MG/ML
100 INJECTION, SOLUTION EPIDURAL; INTRATHECAL; INTRAVENOUS ONCE
Status: COMPLETED | OUTPATIENT
Start: 2019-04-05 | End: 2019-04-05

## 2019-04-05 RX ORDER — ACETAMINOPHEN 325 MG/1
975 TABLET ORAL EVERY 8 HOURS
Status: COMPLETED | OUTPATIENT
Start: 2019-04-05 | End: 2019-04-08

## 2019-04-05 RX ORDER — KETOROLAC TROMETHAMINE 30 MG/ML
30 INJECTION, SOLUTION INTRAMUSCULAR; INTRAVENOUS EVERY 6 HOURS
Status: COMPLETED | OUTPATIENT
Start: 2019-04-05 | End: 2019-04-06

## 2019-04-05 RX ORDER — NALOXONE HYDROCHLORIDE 0.4 MG/ML
.1-.4 INJECTION, SOLUTION INTRAMUSCULAR; INTRAVENOUS; SUBCUTANEOUS
Status: DISCONTINUED | OUTPATIENT
Start: 2019-04-05 | End: 2019-04-05

## 2019-04-05 RX ORDER — ONDANSETRON 4 MG/1
4 TABLET, ORALLY DISINTEGRATING ORAL EVERY 30 MIN PRN
Status: DISCONTINUED | OUTPATIENT
Start: 2019-04-05 | End: 2019-04-05 | Stop reason: HOSPADM

## 2019-04-05 RX ORDER — HYDROCORTISONE 2.5 %
CREAM (GRAM) TOPICAL 3 TIMES DAILY PRN
Status: DISCONTINUED | OUTPATIENT
Start: 2019-04-05 | End: 2019-04-08 | Stop reason: HOSPADM

## 2019-04-05 RX ORDER — OXYTOCIN/0.9 % SODIUM CHLORIDE 30/500 ML
340 PLASTIC BAG, INJECTION (ML) INTRAVENOUS CONTINUOUS PRN
Status: DISCONTINUED | OUTPATIENT
Start: 2019-04-05 | End: 2019-04-08 | Stop reason: HOSPADM

## 2019-04-05 RX ORDER — FENTANYL CITRATE 50 UG/ML
25-50 INJECTION, SOLUTION INTRAMUSCULAR; INTRAVENOUS
Status: DISCONTINUED | OUTPATIENT
Start: 2019-04-05 | End: 2019-04-05 | Stop reason: HOSPADM

## 2019-04-05 RX ORDER — SIMETHICONE 80 MG
80 TABLET,CHEWABLE ORAL 4 TIMES DAILY PRN
Status: DISCONTINUED | OUTPATIENT
Start: 2019-04-05 | End: 2019-04-08 | Stop reason: HOSPADM

## 2019-04-05 RX ORDER — EPHEDRINE SULFATE 50 MG/ML
5 INJECTION, SOLUTION INTRAMUSCULAR; INTRAVENOUS; SUBCUTANEOUS
Status: DISCONTINUED | OUTPATIENT
Start: 2019-04-05 | End: 2019-04-05

## 2019-04-05 RX ORDER — CEFAZOLIN SODIUM 1 G/3ML
1 INJECTION, POWDER, FOR SOLUTION INTRAMUSCULAR; INTRAVENOUS SEE ADMIN INSTRUCTIONS
Status: DISCONTINUED | OUTPATIENT
Start: 2019-04-05 | End: 2019-04-05

## 2019-04-05 RX ORDER — NALOXONE HYDROCHLORIDE 0.4 MG/ML
.1-.4 INJECTION, SOLUTION INTRAMUSCULAR; INTRAVENOUS; SUBCUTANEOUS
Status: ACTIVE | OUTPATIENT
Start: 2019-04-05 | End: 2019-04-06

## 2019-04-05 RX ORDER — OXYTOCIN 10 [USP'U]/ML
10 INJECTION, SOLUTION INTRAMUSCULAR; INTRAVENOUS
Status: DISCONTINUED | OUTPATIENT
Start: 2019-04-05 | End: 2019-04-08 | Stop reason: HOSPADM

## 2019-04-05 RX ORDER — NALOXONE HYDROCHLORIDE 0.4 MG/ML
.1-.4 INJECTION, SOLUTION INTRAMUSCULAR; INTRAVENOUS; SUBCUTANEOUS
Status: DISCONTINUED | OUTPATIENT
Start: 2019-04-05 | End: 2019-04-08 | Stop reason: HOSPADM

## 2019-04-05 RX ORDER — BUPIVACAINE HYDROCHLORIDE 2.5 MG/ML
INJECTION, SOLUTION EPIDURAL; INFILTRATION; INTRACAUDAL PRN
Status: DISCONTINUED | OUTPATIENT
Start: 2019-04-05 | End: 2019-04-05

## 2019-04-05 RX ORDER — ONDANSETRON 2 MG/ML
4 INJECTION INTRAMUSCULAR; INTRAVENOUS EVERY 30 MIN PRN
Status: DISCONTINUED | OUTPATIENT
Start: 2019-04-05 | End: 2019-04-05 | Stop reason: HOSPADM

## 2019-04-05 RX ORDER — HYDROMORPHONE HYDROCHLORIDE 1 MG/ML
.3-.5 INJECTION, SOLUTION INTRAMUSCULAR; INTRAVENOUS; SUBCUTANEOUS EVERY 5 MIN PRN
Status: DISCONTINUED | OUTPATIENT
Start: 2019-04-05 | End: 2019-04-05 | Stop reason: HOSPADM

## 2019-04-05 RX ORDER — SODIUM CHLORIDE, SODIUM LACTATE, POTASSIUM CHLORIDE, CALCIUM CHLORIDE 600; 310; 30; 20 MG/100ML; MG/100ML; MG/100ML; MG/100ML
INJECTION, SOLUTION INTRAVENOUS
Status: DISCONTINUED
Start: 2019-04-05 | End: 2019-04-05 | Stop reason: HOSPADM

## 2019-04-05 RX ORDER — AMOXICILLIN 250 MG
2 CAPSULE ORAL 2 TIMES DAILY PRN
Status: DISCONTINUED | OUTPATIENT
Start: 2019-04-05 | End: 2019-04-08 | Stop reason: HOSPADM

## 2019-04-05 RX ORDER — FLUMAZENIL 0.1 MG/ML
0.2 INJECTION, SOLUTION INTRAVENOUS
Status: DISCONTINUED | OUTPATIENT
Start: 2019-04-05 | End: 2019-04-05 | Stop reason: HOSPADM

## 2019-04-05 RX ORDER — ACETAMINOPHEN 325 MG/1
650 TABLET ORAL EVERY 4 HOURS PRN
Status: DISCONTINUED | OUTPATIENT
Start: 2019-04-08 | End: 2019-04-08 | Stop reason: HOSPADM

## 2019-04-05 RX ORDER — ONDANSETRON 4 MG/1
4 TABLET, ORALLY DISINTEGRATING ORAL EVERY 30 MIN PRN
Status: DISCONTINUED | OUTPATIENT
Start: 2019-04-05 | End: 2019-04-05

## 2019-04-05 RX ORDER — BUPIVACAINE HYDROCHLORIDE 7.5 MG/ML
INJECTION, SOLUTION INTRASPINAL
Status: DISCONTINUED
Start: 2019-04-05 | End: 2019-04-05 | Stop reason: HOSPADM

## 2019-04-05 RX ORDER — OXYTOCIN/0.9 % SODIUM CHLORIDE 30/500 ML
PLASTIC BAG, INJECTION (ML) INTRAVENOUS
Status: DISCONTINUED
Start: 2019-04-05 | End: 2019-04-05 | Stop reason: HOSPADM

## 2019-04-05 RX ORDER — CITRIC ACID/SODIUM CITRATE 334-500MG
SOLUTION, ORAL ORAL
Status: DISCONTINUED
Start: 2019-04-05 | End: 2019-04-05 | Stop reason: HOSPADM

## 2019-04-05 RX ORDER — LIDOCAINE 40 MG/G
CREAM TOPICAL
Status: DISCONTINUED | OUTPATIENT
Start: 2019-04-05 | End: 2019-04-08 | Stop reason: HOSPADM

## 2019-04-05 RX ORDER — IBUPROFEN 600 MG/1
600 TABLET, FILM COATED ORAL EVERY 6 HOURS PRN
Status: DISCONTINUED | OUTPATIENT
Start: 2019-04-05 | End: 2019-04-08 | Stop reason: HOSPADM

## 2019-04-05 RX ORDER — ONDANSETRON 2 MG/ML
4 INJECTION INTRAMUSCULAR; INTRAVENOUS EVERY 30 MIN PRN
Status: DISCONTINUED | OUTPATIENT
Start: 2019-04-05 | End: 2019-04-05

## 2019-04-05 RX ORDER — AMOXICILLIN 250 MG
1 CAPSULE ORAL 2 TIMES DAILY PRN
Status: DISCONTINUED | OUTPATIENT
Start: 2019-04-05 | End: 2019-04-08 | Stop reason: HOSPADM

## 2019-04-05 RX ORDER — LABETALOL 20 MG/4 ML (5 MG/ML) INTRAVENOUS SYRINGE
10
Status: DISCONTINUED | OUTPATIENT
Start: 2019-04-05 | End: 2019-04-05 | Stop reason: HOSPADM

## 2019-04-05 RX ORDER — BISACODYL 10 MG
10 SUPPOSITORY, RECTAL RECTAL DAILY PRN
Status: DISCONTINUED | OUTPATIENT
Start: 2019-04-07 | End: 2019-04-08 | Stop reason: HOSPADM

## 2019-04-05 RX ORDER — FENTANYL CITRATE 50 UG/ML
INJECTION, SOLUTION INTRAMUSCULAR; INTRAVENOUS PRN
Status: DISCONTINUED | OUTPATIENT
Start: 2019-04-05 | End: 2019-04-05

## 2019-04-05 RX ORDER — BUPIVACAINE HYDROCHLORIDE 7.5 MG/ML
INJECTION, SOLUTION INTRASPINAL PRN
Status: DISCONTINUED | OUTPATIENT
Start: 2019-04-05 | End: 2019-04-05

## 2019-04-05 RX ORDER — LIDOCAINE 40 MG/G
CREAM TOPICAL
Status: DISCONTINUED | OUTPATIENT
Start: 2019-04-05 | End: 2019-04-05

## 2019-04-05 RX ADMIN — BUPIVACAINE HYDROCHLORIDE 20 ML: 2.5 INJECTION, SOLUTION EPIDURAL; INFILTRATION; INTRACAUDAL at 11:45

## 2019-04-05 RX ADMIN — MORPHINE SULFATE 0.1 MG: 1 INJECTION, SOLUTION EPIDURAL; INTRATHECAL; INTRAVENOUS at 10:17

## 2019-04-05 RX ADMIN — PHENYLEPHRINE HYDROCHLORIDE 0.5 MCG/KG/MIN: 10 INJECTION, SOLUTION INTRAMUSCULAR; INTRAVENOUS; SUBCUTANEOUS at 10:17

## 2019-04-05 RX ADMIN — CEFAZOLIN SODIUM 2 G: 2 INJECTION, SOLUTION INTRAVENOUS at 10:29

## 2019-04-05 RX ADMIN — SENNOSIDES AND DOCUSATE SODIUM 2 TABLET: 8.6; 5 TABLET ORAL at 20:05

## 2019-04-05 RX ADMIN — KETOROLAC TROMETHAMINE 30 MG: 30 INJECTION, SOLUTION INTRAMUSCULAR; INTRAVENOUS at 18:23

## 2019-04-05 RX ADMIN — OXYCODONE HYDROCHLORIDE 5 MG: 5 TABLET ORAL at 21:16

## 2019-04-05 RX ADMIN — SODIUM CHLORIDE, SODIUM LACTATE, POTASSIUM CHLORIDE, CALCIUM CHLORIDE AND DEXTROSE MONOHYDRATE: 5; 600; 310; 30; 20 INJECTION, SOLUTION INTRAVENOUS at 17:29

## 2019-04-05 RX ADMIN — SODIUM CHLORIDE, POTASSIUM CHLORIDE, SODIUM LACTATE AND CALCIUM CHLORIDE: 600; 310; 30; 20 INJECTION, SOLUTION INTRAVENOUS at 10:10

## 2019-04-05 RX ADMIN — OXYCODONE HYDROCHLORIDE 5 MG: 5 TABLET ORAL at 16:12

## 2019-04-05 RX ADMIN — ACETAMINOPHEN 975 MG: 325 TABLET, FILM COATED ORAL at 21:16

## 2019-04-05 RX ADMIN — ONDANSETRON 4 MG: 2 INJECTION INTRAMUSCULAR; INTRAVENOUS at 10:48

## 2019-04-05 RX ADMIN — ACETAMINOPHEN 975 MG: 325 TABLET, FILM COATED ORAL at 13:15

## 2019-04-05 RX ADMIN — KETOROLAC TROMETHAMINE 30 MG: 30 INJECTION, SOLUTION INTRAMUSCULAR at 11:28

## 2019-04-05 RX ADMIN — OXYTOCIN-SODIUM CHLORIDE 0.9% IV SOLN 30 UNIT/500ML 300 ML/HR: 30-0.9/5 SOLUTION at 10:46

## 2019-04-05 RX ADMIN — BUPIVACAINE HYDROCHLORIDE IN DEXTROSE 1.6 ML: 7.5 INJECTION, SOLUTION SUBARACHNOID at 10:17

## 2019-04-05 RX ADMIN — AZATHIOPRINE 100 MG: 50 TABLET ORAL at 13:15

## 2019-04-05 RX ADMIN — SODIUM CHLORIDE, POTASSIUM CHLORIDE, SODIUM LACTATE AND CALCIUM CHLORIDE 1000 ML: 600; 310; 30; 20 INJECTION, SOLUTION INTRAVENOUS at 09:01

## 2019-04-05 RX ADMIN — KETOROLAC TROMETHAMINE 30 MG: 30 INJECTION, SOLUTION INTRAMUSCULAR; INTRAVENOUS at 23:42

## 2019-04-05 RX ADMIN — OXYTOCIN-SODIUM CHLORIDE 0.9% IV SOLN 30 UNIT/500ML 100 ML/HR: 30-0.9/5 SOLUTION at 13:21

## 2019-04-05 RX ADMIN — FENTANYL CITRATE 15 MCG: 50 INJECTION, SOLUTION INTRAMUSCULAR; INTRAVENOUS at 10:17

## 2019-04-05 RX ADMIN — BUPIVACAINE 20 ML: 13.3 INJECTION, SUSPENSION, LIPOSOMAL INFILTRATION at 11:45

## 2019-04-05 RX ADMIN — OXYCODONE HYDROCHLORIDE 5 MG: 5 TABLET ORAL at 23:55

## 2019-04-05 RX ADMIN — SODIUM CITRATE AND CITRIC ACID MONOHYDRATE 30 ML: 500; 334 SOLUTION ORAL at 09:47

## 2019-04-05 NOTE — OP NOTE
[facility]  Full Operative Progress Note     Surgery Date:  2019  Surgeon:  Chen Prajapati MD  Assistants:  Verónica Bishop MD PGY-3      Pre-op Diagnosis:    - Intrauterine pregnancy at 39w5d  - Crohns disease - stable on azathioprine  - History of prior  for breech presentation      Post-op Diagnosis:   - Same   - Liveborn female infant     Procedure:  repeat low-transverse  section with double layer uterine closure via Pfannenstiel incision    Anesthesia: Spinal   QBL:  440 mL  IVF:  1200 mL crystalloid total  UOP:  200 mL clear urine at the end of the case  Drains: Park Catheter   Specimens:  None   Complications: None   Indications:   Citlalli Martin is a 28 year old  at 39w5d admitted for scheduled repeat .  The risks, benefits, and alternatives of  section were discussed with the patient, and she agreed to proceed.     Findings:   1. Mild midline adhesions of rectus to anterior abdominal wall  2. Clear amniotic fluid  3. Liveborn female infant in OA presentation. Apgars 8 at 1 minute & 9 at 5 minutes. Weight [id number].  4. Normal uterus, fallopian tubes, and ovaries.     Procedure Details:   The patient was brought to the OR, where adequate spinal anesthesia was administered.  She was placed in the dorsal supine position with a slight leftward tilt. She was prepped and draped in the usual sterile fashion. A surgical time out was performed. A pfannenstiel skin incision was made with the scalpel, and carried down to the underlying fascia with sharp dissection. The fascia was incised in the midline, and the incision was extended laterally with the Eller scissors. The superior aspect of the fascia was grasped with the Kocher clamps and dissected off of the underlying rectus muscles with a scalpel. Attention was then turned to the inferior aspect of the fascia, which was similarly dissected off of the underlying rectus muscles. The  rectus muscles were  in the midline, and the peritoneum was entered sharply with metzenbaum scissors, and the opening was extended with digital pressure and with Kayode. The bladder blade was placed. A transverse hysterotomy was made with the scalpel in the lower uterine segment, and the incision was extended with digital pressure. The infant was noted to be in the MAURILIO position, and was delivered atraumatically. The shoulders delivered easily.  No nuchal cord was noted. The cord was doubly clamped and cut after 1 minute, and the infant was handed off to the awaiting nursing staff. A segment of cord was cut and saved for gases if needed. The placenta was delivered with gentle traction on the umbilical cord and uterine massage. The uterus was left in the abdomen and cleared of all clots and debris. Uterine tone was noted to be firm with 30 units of pitocin given through the running IV and uterine massage.  The hysterotomy was closed with a running locked suture of 0 Vicryl.  The hysterotomy was then imbricated using an 0 Vicryl suture. There was some serosal bleeding that was stopped with cautery and two figure of 8 sutures were placed in the middle of the hysterotomy. The hysterotomy was noted to be hemostatic. The pericolic gutters were cleared of all clots and debris. The hysterotomy was reexamined and noted to be hemostatic. The fascia and rectus muscles were examined and areas of oozing were controlled with electrocautery. SepraFilm was placed over the hysterotomy and rectus muscles. The fascia was closed with a running 0 Vicryl suture. The subcutaneous tissue was irrigated and areas of oozing were controlled with electrocautery. The subcutaneous tissue was less than 2 cm in thickness, and was therefore not closed. The skin was closed with 4-0 Vicryl and covered with a sterile dressing.    All sponge, needle, and instrument counts were correct. The patient tolerated the procedure well, and was transferred  to recovery in stable condition. Dr. Prajapati was present and scrubbed for the entirety of the procedure.     Verónica Bishop MD  OB GYN PGY-3  4/5/2019, 11:37 AM    Physician Attestation   I was present for the entire procedure between opening and closing.    Chen Prajapati  Date of Service (when I saw the patient): 04/05/19

## 2019-04-05 NOTE — H&P
[facility] Labor and Delivery History and Physical    Citlalli Miles MRN# [id number]   Age: 28 year old YOB: 1990     Date of Admission: (Not on file)    Primary care provider: No Ref-Primary, Physician         HPI:     Citlalli Miles is a 28 year old  at 39w5d by LMP c/w 13+5 US here for schedule repeat .     Patient reports feeling well apart from a headcold. She took robitussin and sudafed this morning.  She denies decreased fetal movement, loss of fluid, vaginal bleeding, or regular contractions.  No HA, vision changes, chest pain, shortness of breath, nausea, vomiting, or dysuria.       OB Problem List:   1. IUP at 39w5d   2. Crohns disease - stable on azathioprine  3. History of prior  for breech presentation  4. Hx depression - no meds  5. Lupus - no meds, normal BP         Pregnancy history:     OBSTETRIC HISTORY:    Obstetric History       T1      L0     SAB0   TAB0   Ectopic0   Multiple0   Live Births0       # Outcome Date GA Lbr Gordon/2nd Weight Sex Delivery Anes PTL Lv   2 Current            1 Term 16 39w1d  3.289 kg (7 lb 4 oz) F CS-LTranv Spinal        Name: KELVIN MILES      Apgar1:  6                Apgar5: 7          Prenatal Labs:   Lab Results   Component Value Date    ABO B 2019    RH Pos 2019    AS Neg 2019    HEPBANG Nonreactive 2018    TREPAB Negative 2016    HGB 11.1 (L) 2019       GBS Status:   Lab Results   Component Value Date    GBS Negative 2019     Medication Prior to Admission  Medications Prior to Admission   Medication Sig Dispense Refill Last Dose     AZATHIOPRINE PO Take 100 mg by mouth daily    2019 at 1700     Prenatal Vit-Fe Fumarate-FA (PRENATAL MULTIVITAMIN  PLUS IRON) 27-0.8 MG TABS per tablet Take 1 tablet by mouth daily   2019 at 1700     Misc. Devices (BREAST PUMP) MISC 1 each as needed (for feeding baby) 1 each 0             Maternal Past Medical History:     Past Medical History:   Diagnosis Date     Crohn's disease (H)      Depression, major      Lupus                        Family History:     Family History   Problem Relation Age of Onset     Diabetes Father         type 2     Arthritis Father      Hypertension Father      Hyperlipidemia Father      Bipolar Disorder Mother      Coronary Artery Disease Paternal Grandfather      Heart Failure Paternal Grandfather      Cerebrovascular Disease Paternal Grandmother      Chronic Obstructive Pulmonary Disease Paternal Grandmother             Social History:     Social History     Socioeconomic History     Marital status:      Spouse name: Not on file     Number of children: Not on file     Years of education: Not on file     Highest education level: Not on file   Occupational History     Not on file   Social Needs     Financial resource strain: Not on file     Food insecurity:     Worry: Not on file     Inability: Not on file     Transportation needs:     Medical: Not on file     Non-medical: Not on file   Tobacco Use     Smoking status: Never Smoker     Smokeless tobacco: Never Used   Substance and Sexual Activity     Alcohol use: No     Alcohol/week: 0.0 oz     Drug use: No     Sexual activity: Yes     Partners: Male   Lifestyle     Physical activity:     Days per week: Not on file     Minutes per session: Not on file     Stress: Not on file   Relationships     Social connections:     Talks on phone: Not on file     Gets together: Not on file     Attends Islam service: Not on file     Active member of club or organization: Not on file     Attends meetings of clubs or organizations: Not on file     Relationship status: Not on file     Intimate partner violence:     Fear of current or ex partner: Not on file     Emotionally abused: Not on file     Physically abused: Not on file     Forced sexual activity: Not on file   Other Topics Concern     Parent/sibling w/ CABG, MI or  "angioplasty before 65F 55M? Not Asked   Social History Narrative    Caffeine intake/servings daily - 1    Calcium intake/servings daily - 3    Exercise 5 times weekly - describe ; walks, jogs, eliptical    Sunscreen used - Yes    Seatbelts used - Yes    Guns stored in the home - No    Self Breast Exam - Yes    Pap test up to date -  Yes    Eye exam up to date -  Yes    Dental exam up to date -  Yes    DEXA scan up to date -  No    Flex Sig/Colonoscopy up to date -  Yes    Mammography up to date -  No    Immunizations reviewed and up to date - Yes    Abuse: Current or Past (Physical, Sexual or Emotional) - No    Do you feel safe in your environment - Yes    Do you cope well with stress - Yes    Do you suffer from insomnia - No                    Review of Systems:   Negative except as noted above in the HPI         Physical Exam:     Vitals:    19 0901   Resp: 16   Temp: 97.6  F (36.4  C)   TempSrc: Oral   119/75   HR 91    Gen: Alert and oriented in NAD   Cardio: RRR  Resp: comfortable on room air  Abdomen: gravid, soft, nontender. EFW 7.5lbs  Cervix: deferred  Presentation:Cephalic by recent US  Fetal Heart Rate Tracing: baseline 125, moderate variability, accels +, no decels  Tocometer: occasional ctx    Imaging:    Dating Ultrasound   GA: 13+5      Single IUP, +Fetal cardiac activity   Fetal Anatomy Survey   GA: 19+1     Single IUP, Fetal Anatomy WNL, 3 VC, Placenta: anterior   3/7/19   GA: 35+4  EFW 2734g (63.8%tile)            Assessment:   Citlalli Martin is a 28 year old  at 39w5d by LMP c/w 13wk US admitted for scheduled repeat .        Plan:     # Scheduled repeat   - Admit to labor and delivery, routine labs   - prior  for breech,  Per prior op note: \"Thin, filmy uterine serosal adhesions to omentum, no other intra-abdominal adhesive disease.  Moderate midline fascial and rectus adhesions\"  - reviewed risks, benefits and alternatives and she would like to " proceed. Consents signed.    # crohn's; stable on azathioprine    # FWB: Cat 1, reactive   anterior placenta; EFW 3700g    # PNC: Rh pos, RI     Verónica Bishop, PGY3  OB/Gyn    Physician Attestation   I, Chen Prajapati, personally examined and evaluated this patient.  I discussed the patient with the medical student and/or resident and care team, and agree with the assessment and plan of care as documented in the note of 04/05/19  [date].      I personally reviewed vital signs, medications, labs and fetal heart rate monitoring..    Key findings: PARQ held, consent signed, proceed with surgery  Chen Prajapati MD  Date of Service (when I saw the patient): 04/05/19

## 2019-04-05 NOTE — PLAN OF CARE
Data: Citlalli Martin transferred to [facility] via cart at 1400. Baby transferred via parent's arms.  Action: Receiving unit notified of transfer: Yes. Patient and family notified of room change. Report given to Aga at 1345. Belongings sent to receiving unit. Accompanied by Registered Nurse. Oriented patient to surroundings. Call light within reach. ID bands double-checked with receiving RN.  Response: Patient tolerated transfer and is stable.

## 2019-04-05 NOTE — ANESTHESIA PROCEDURE NOTES
Peripheral Nerve Block Procedure Note    Staff:     Anesthesiologist:  Pauly Chen MD    Resident/CRNA:  Mirtha Erwin MD    Block performed by resident/CRNA in the presence of a teaching physician    Location: Other (See Comment)  Procedure Start/Stop TImes:     patient identified, IV checked, risks and benefits discussed, informed consent, monitors and equipment checked, pre-op evaluation, at physician/surgeon's request and post-op pain management      Correct Patient: Yes      Correct Position: Yes      Correct Site: Yes      Correct Procedure: Yes      Correct Laterality:  N/A    Site Marked:  N/A  Procedure details:     Procedure:  TAP    ASA:  2    Laterality:  Bilateral    Position:  Sitting    Sterile Prep: Betadine      Local skin infiltration:  None    Insertion Site:  T5-6    Needle:  Short bevel    Needle gauge:  17    Needle length (inches):  3.13    Needle length (mm):  40    Ultrasound: Yes      Ultrasound used to identify targeted nerve, plexus, or vascular structure and placed a needle adjacent to it      Permanent Image entered into patiient's record      Abnormal pain on injection: No      Blood Aspirated: No      Paresthesias:  No    Bleeding at site: No      Bolus via:  Needle    Infusion Method:  Single Shot    Complications:  None

## 2019-04-05 NOTE — ANESTHESIA POSTPROCEDURE EVALUATION
Anesthesia POST Procedure Evaluation    Patient: Citlalli Martin   MRN:     [id number] Gender:   female   Age:    28 year old :      1990        Preoperative Diagnosis: Previous   Procedure(s):  REPEAT  SECTION   Postop Comments: No value filed.       Anesthesia Type:  Regional    Reportable Event: NO     PAIN: Uncomplicated   Sign Out status: Comfortable, Well controlled pain     PONV: No PONV   Sign Out status:  No Nausea or Vomiting     Neuro/Psych: Uneventful perioperative course   Sign Out Status: Preoperative baseline; Age appropriate mentation     Airway/Resp.: Uneventful perioperative course   Sign Out Status: Non labored breathing, age appropriate RR; Resp. Status within EXPECTED Parameters     CV: Uneventful perioperative course   Sign Out status: Appropriate BP and perfusion indices; Appropriate HR/Rhythm     Disposition:   Sign Out in:  PACU  Disposition:  Phase II; Home  Recovery Course: Uneventful  Follow-Up: Not required           Last Anesthesia Record Vitals:  CRNA VITALS  2019 1102 - 2019 1202      2019             Ht Rate:  90          Last PACU/Preop Vitals:  Vitals:    19 1225 19 1240 19 1255   BP: 122/84 (!) 119/92 124/90   Pulse: 79 74 86   Resp: 13 14 10   Temp:      SpO2: 99% 100% 99%         Electronically Signed By: Pauly Chen MD, 2019, 1:03 PM

## 2019-04-05 NOTE — PLAN OF CARE
Pt very stable since arrival to [facility] at 1415. VSS, lochia minimal.  infant with great latch observed.Took first dose of oxycodone at 1615. Assisted up to ambulate to bathroom at 1700 for oral care/araseli care, tolerated activity without difficulty. Will remove berg this evening. Urine output still concentrated, pitocin completed now and D5LR hung. Pt taking oral fluids and has resumed regular diet.

## 2019-04-05 NOTE — DISCHARGE SUMMARY
[facility] Discharge Summary    Citlalli Martin MRN# [id number]   Age: 28 year old YOB: 1990     Date of Admission:  2019  Date of Discharge:  2019  Admitting Physician:  Chen Prajapati MD  Discharge Physician:  Alanna River MD     Admit Dx:   - IUP at 39w5d   - Crohns disease - stable on azathioprine  - History of prior  for breech presentation  - Hx depression - no meds  - Lupus - no meds, normal BP    Discharge Dx:  - Same as above, s/p repeat low transverse  section  - gHTN    Procedures:  - repeat low transverse  section with double layer uterine closure via Pfannenstiel incision  - Spinal analgesia    Admit HPI:  Citlalli Martin is a 28 year old  at 39w5d by LMP c/w 13+5 US here for schedule repeat .   Please see her admit H&P for full details of her PMH, PSH, Meds, Allergies and exam on admit.    Operative Course:  Surgery was uncomplicated. QBL from the delivery was 440. Please see her  Section Operative Note for full details regarding her delivery.    Operative Findings:   1. Mild midline adhesions of rectus to anterior abdominal wall  2. Clear amniotic fluid  3. Liveborn female infant in OA presentation. Apgars 8 at 1 minute & 9 at 5 minutes. Weight [id number].  4. Normal uterus, fallopian tubes, and ovaries.       Postoperative Course:  Her postoperative course was uncomplicated. On POD#2, she was meeting all of her postpartum goals and deemed stable for discharge. She was voiding without difficulty, tolerating a regular diet without nausea and vomiting, her pain was well controlled on oral pain medicines and her lochia was appropriate. Her hemoglobin prior to delivery was 11.1 and after delivery was 10.4. Her Rh status was positive and Rhogam was not indicated.    Discharge Medications:     Review of your medicines      START taking      Dose / Directions   acetaminophen 325 MG  tablet  Commonly known as:  TYLENOL      Dose:  650 mg  Take 2 tablets (650 mg) by mouth every 4 hours as needed for other (multimodal surgical pain management along with NSAIDS and opioid medication as indicated based on pain control and physical function.)  Quantity:  100 tablet  Refills:  0     ibuprofen 600 MG tablet  Commonly known as:  ADVIL/MOTRIN      Dose:  600 mg  Take 1 tablet (600 mg) by mouth every 6 hours as needed for other (cramping)  Quantity:  60 tablet  Refills:  0     oxyCODONE 5 MG tablet  Commonly known as:  ROXICODONE      Dose:  5-10 mg  Take 1-2 tablets (5-10 mg) by mouth every 6 hours as needed for severe pain  Quantity:  10 tablet  Refills:  0     senna-docusate 8.6-50 MG tablet  Commonly known as:  SENOKOT-S/PERICOLACE      Dose:  1 tablet  Take 1 tablet by mouth 2 times daily as needed for constipation  Quantity:  60 tablet  Refills:  0        CONTINUE these medicines which have NOT CHANGED      Dose / Directions   AZATHIOPRINE PO      Dose:  100 mg  Take 100 mg by mouth daily  Refills:  0     breast pump Misc  Used for:  Previous  delivery, antepartum condition or complication      Dose:  1 each  1 each as needed (for feeding baby)  Quantity:  1 each  Refills:  0     prenatal multivitamin w/iron 27-0.8 MG tablet      Dose:  1 tablet  Take 1 tablet by mouth daily  Refills:  0           Where to get your medicines      These medications were sent to [facility] [place], [place] - [address]  [address] [address]    Phone:  [phone]     acetaminophen 325 MG tablet    ibuprofen 600 MG tablet    senna-docusate 8.6-50 MG tablet     Some of these will need a paper prescription and others can be bought over the counter. Ask your nurse if you have questions.    Bring a paper prescription for each of these medications    oxyCODONE 5 MG tablet           Discharge/Disposition:  Citlalli Martin was discharged to home in stable condition  with the following instructions/medications:  1) Call for temperature > 100.4, bright red vaginal bleeding >1 pad an hour x 2 hours, foul smelling vaginal discharge, pain not controlled by usual oral pain meds, persistent nausea and vomiting not controlled on medications, drainage or redness from incision site  2) She desired IUD for contraception at 6w follow-up.  3) For feeding she decided to breastfeed.  4) She was instructed to follow-up with her primary OB in 6 weeks for a routine postpartum visit and 3-5 day blood pressure check.  5) Discharge activity:  No heavy lifting >15 lbs or strenuous activity for 6 weeks, pelvic rest for 6 weeks, no driving or operating machinery while on narcotics.    Jumana Maynard MD, MHS  Ob/Gyn PGY3  04/08/19        Physician Attestation   I, Alanna River, saw and evaluated this patient prior to discharge.  I discussed the patient with the resident/fellow and agree with plan of care as documented in the note.      I personally reviewed vital signs, medications, labs and exam.    I personally spent 15 minutes on discharge activities.    Alanna River MD  Date of Service (when I saw the patient): 04/08/19

## 2019-04-05 NOTE — ANESTHESIA PROCEDURE NOTES
Spinal/LP Procedure Note    Spinal Block  Date/Time: 4/5/2019 10:17 AM  Staff:     Anesthesiologist:  Pauly Chen MD    Resident/CRNA:  Mirtha Erwin MD    Spinal/LP performed by resident/CRNA in presence of a teaching physician.    Location: OB and In OR BEFORE Induction  Procedure Start/Stop Times:      4/5/2019 10:12 AM     4/5/2019 10:17 AM    patient identified, IV checked, site marked, risks and benefits discussed, informed consent, monitors and equipment checked, pre-op evaluation and at physician/surgeon's request      Correct Patient: Yes      Correct Position: Yes      Correct Site: Yes      Correct Procedure: Yes      Correct Laterality:  N/A    Site Marked:  N/A  Procedure:     Procedure:  Intrathecal    ASA:  2    Position:  Sitting    Sterile Prep: Betadine      Insertion site:  L3-4    Approach:  Midline    Needle Type:  Grace    Needle gauge (G):  25    Local Skin Infiltration:  1% lidocaine    amount (ml):  3    Needle Length (in):  3.5    Introducer used: Yes      Introducer gauge:  20 G    Attempts:  1    Redirects:  0    CSF:  Clear    Paresthesias:  No    Time injected:  10:37  Assessment/Narrative:     Sensory Level:  T4

## 2019-04-05 NOTE — ANESTHESIA CARE TRANSFER NOTE
Patient: Citlalli Martin    Procedure(s):  REPEAT  SECTION    Diagnosis: Previous  Diagnosis Additional Information: No value filed.    Anesthesia Type:   No value filed.     Note:  Airway :Room Air  Patient transferred to:PACU  Comments: VSS. Pt denies n/v, pain    Mirtha Erwin MD  Anesthesia Resident - CA1  2019  1:00 PMHandoff Report: Identifed the Patient, Identified the Reponsible Provider, Reviewed the pertinent medical history, Discussed the surgical course, Reviewed Intra-OP anesthesia mangement and issues during anesthesia, Set expectations for post-procedure period and Allowed opportunity for questions and acknowledgement of understanding      Vitals: (Last set prior to Anesthesia Care Transfer)    CRNA VITALS  2019 1102 - 2019 1202      2019             Ht Rate:  90                Electronically Signed By: Mirtha Erwin MD  2019  12:59 PM

## 2019-04-05 NOTE — PLAN OF CARE
Patient arrived to [facility] unit via zoom cart at 1415,with belongings, accompanied by spouse/ significant other, with infant in arms. Received report from MARY ANN Machado and checked bands. Unit and room orientation completd. Call light given; no concerns present at this time. Continue with plan of care.

## 2019-04-06 LAB
ALT SERPL W P-5'-P-CCNC: 18 U/L (ref 0–50)
AST SERPL W P-5'-P-CCNC: 44 U/L (ref 0–45)
CREAT SERPL-MCNC: 0.56 MG/DL (ref 0.52–1.04)
ERYTHROCYTE [DISTWIDTH] IN BLOOD BY AUTOMATED COUNT: 13 % (ref 10–15)
GFR SERPL CREATININE-BSD FRML MDRD: >90 ML/MIN/{1.73_M2}
HCT VFR BLD AUTO: 31.7 % (ref 35–47)
HGB BLD-MCNC: 10.4 G/DL (ref 11.7–15.7)
MCH RBC QN AUTO: 28.9 PG (ref 26.5–33)
MCHC RBC AUTO-ENTMCNC: 32.8 G/DL (ref 31.5–36.5)
MCV RBC AUTO: 88 FL (ref 78–100)
PLATELET # BLD AUTO: 196 10E9/L (ref 150–450)
RBC # BLD AUTO: 3.6 10E12/L (ref 3.8–5.2)
WBC # BLD AUTO: 11.1 10E9/L (ref 4–11)

## 2019-04-06 PROCEDURE — 25000131 ZZH RX MED GY IP 250 OP 636 PS 637: Performed by: STUDENT IN AN ORGANIZED HEALTH CARE EDUCATION/TRAINING PROGRAM

## 2019-04-06 PROCEDURE — 25000128 H RX IP 250 OP 636: Performed by: STUDENT IN AN ORGANIZED HEALTH CARE EDUCATION/TRAINING PROGRAM

## 2019-04-06 PROCEDURE — 84450 TRANSFERASE (AST) (SGOT): CPT | Performed by: STUDENT IN AN ORGANIZED HEALTH CARE EDUCATION/TRAINING PROGRAM

## 2019-04-06 PROCEDURE — 82565 ASSAY OF CREATININE: CPT | Performed by: STUDENT IN AN ORGANIZED HEALTH CARE EDUCATION/TRAINING PROGRAM

## 2019-04-06 PROCEDURE — 36415 COLL VENOUS BLD VENIPUNCTURE: CPT | Performed by: STUDENT IN AN ORGANIZED HEALTH CARE EDUCATION/TRAINING PROGRAM

## 2019-04-06 PROCEDURE — 25000132 ZZH RX MED GY IP 250 OP 250 PS 637: Performed by: STUDENT IN AN ORGANIZED HEALTH CARE EDUCATION/TRAINING PROGRAM

## 2019-04-06 PROCEDURE — 12000001 ZZH R&B MED SURG/OB UMMC

## 2019-04-06 PROCEDURE — 84460 ALANINE AMINO (ALT) (SGPT): CPT | Performed by: STUDENT IN AN ORGANIZED HEALTH CARE EDUCATION/TRAINING PROGRAM

## 2019-04-06 PROCEDURE — 85027 COMPLETE CBC AUTOMATED: CPT | Performed by: STUDENT IN AN ORGANIZED HEALTH CARE EDUCATION/TRAINING PROGRAM

## 2019-04-06 RX ORDER — ACETAMINOPHEN 325 MG/1
650 TABLET ORAL EVERY 4 HOURS PRN
Qty: 100 TABLET | Refills: 0 | Status: SHIPPED | OUTPATIENT
Start: 2019-04-08 | End: 2020-09-16

## 2019-04-06 RX ORDER — AMOXICILLIN 250 MG
1 CAPSULE ORAL 2 TIMES DAILY PRN
Qty: 60 TABLET | Refills: 0 | Status: SHIPPED | OUTPATIENT
Start: 2019-04-06 | End: 2020-01-02

## 2019-04-06 RX ORDER — IBUPROFEN 600 MG/1
600 TABLET, FILM COATED ORAL EVERY 6 HOURS PRN
Qty: 60 TABLET | Refills: 0 | Status: SHIPPED | OUTPATIENT
Start: 2019-04-06 | End: 2020-09-16

## 2019-04-06 RX ADMIN — ACETAMINOPHEN 975 MG: 325 TABLET, FILM COATED ORAL at 21:05

## 2019-04-06 RX ADMIN — OXYCODONE HYDROCHLORIDE 5 MG: 5 TABLET ORAL at 06:05

## 2019-04-06 RX ADMIN — OXYCODONE HYDROCHLORIDE 5 MG: 5 TABLET ORAL at 08:54

## 2019-04-06 RX ADMIN — OXYCODONE HYDROCHLORIDE 5 MG: 5 TABLET ORAL at 02:57

## 2019-04-06 RX ADMIN — OXYCODONE HYDROCHLORIDE 5 MG: 5 TABLET ORAL at 13:51

## 2019-04-06 RX ADMIN — AZATHIOPRINE 100 MG: 50 TABLET ORAL at 08:54

## 2019-04-06 RX ADMIN — ACETAMINOPHEN 975 MG: 325 TABLET, FILM COATED ORAL at 12:26

## 2019-04-06 RX ADMIN — KETOROLAC TROMETHAMINE 30 MG: 30 INJECTION, SOLUTION INTRAMUSCULAR; INTRAVENOUS at 06:05

## 2019-04-06 RX ADMIN — OXYCODONE HYDROCHLORIDE 5 MG: 5 TABLET ORAL at 21:04

## 2019-04-06 RX ADMIN — IBUPROFEN 600 MG: 600 TABLET ORAL at 17:56

## 2019-04-06 RX ADMIN — OXYCODONE HYDROCHLORIDE 5 MG: 5 TABLET ORAL at 23:58

## 2019-04-06 RX ADMIN — ACETAMINOPHEN 975 MG: 325 TABLET, FILM COATED ORAL at 04:34

## 2019-04-06 RX ADMIN — SENNOSIDES AND DOCUSATE SODIUM 2 TABLET: 8.6; 5 TABLET ORAL at 21:04

## 2019-04-06 RX ADMIN — OXYCODONE HYDROCHLORIDE 5 MG: 5 TABLET ORAL at 17:56

## 2019-04-06 RX ADMIN — SENNOSIDES AND DOCUSATE SODIUM 1 TABLET: 8.6; 5 TABLET ORAL at 08:54

## 2019-04-06 RX ADMIN — KETOROLAC TROMETHAMINE 30 MG: 30 INJECTION, SOLUTION INTRAMUSCULAR; INTRAVENOUS at 12:24

## 2019-04-06 RX ADMIN — IBUPROFEN 600 MG: 600 TABLET ORAL at 23:58

## 2019-04-06 NOTE — PROGRESS NOTES
Post Partum Progress Note    Subjective:  Patient is doing well.  No complaints. Minimal lochia.  Pain well controlled on pain meds.  Tolerating PO and ambulating without any issues. Passing flatus. S/p berg, voiding. Denies any fever, chills, SOB, chest pain, N/V,  headache, dizziness.  Planning on breast feeding.     Objective:  Vitals:    19 1800 19 2117 19 0100 19 0520   BP: 122/85 (!) 134/92 120/84 120/81   Pulse:       Resp:  18    Temp:  97.9  F (36.6  C) 98.2  F (36.8  C) 98.2  F (36.8  C)   TempSrc:  Oral Oral Oral   SpO2: 99% 99% 98% 99%       General: Well appearing in NAD  CV:  Regular rate  Resp:  CTAB, no wheezes, rales  Abd:  Soft, nontender, nondistended, fundus firm at approximately 2 cm below umbilicus; Incision: C/D/I with bandage in place  Ext:  trace edema in bilateral LE    Assessment and Plan:  28 year old old  post-partum day 1 s/p RLTCS.  AFVSS.  Doing well without any complaints.    gHTN  - New diagnosis in peripartum period based on elevated diastolic BP x 2 >4h apart  - AM HELLP labs pending     Crohn's  -stable on azathioprine    Postpartum cares  -Hgb 11.2 > QBL 440mL> AM pending  -Rh positive; no rhogam needed  -Rubella immune; no MMR needed  -Breast feeding  -birth control not discussed  -Plan to DC PPD#2-3      Jumana Maynard MD, MHS  Ob/Gyn PGY3  19    I, Maryam Christianson MD, personally saw and evaluated this patient.  I discussed the patient with the resident and care team, and agree with the assessment and plan of care as documented in the resident's note of 19.  I personally reviewed vital signs, medications, lab, and exam.     Key Findings:  Ambulating, tolerating diet.  Fundus firm, incision healing     PLAN: Continue routine post op care.  Anticipate discharge home when meeting goals, likely tomorrow.    Maryam Christianson MD   Date of Service (when I saw the patient) 19

## 2019-04-06 NOTE — PLAN OF CARE
Vital signs stable and postpartum checks  within normal limits. Pt is up ambulating on the hallway and is voiding with no problem. Breastfeeding is going well and pt is independent with latching. Needs to improve on positioning. Nipples are slightly tender, but tolerable with feedings. Complains of cramping and incisional pain. Pt medicated with Tylenol, Roxicodone and Toradol for pain control. Checked latch and encouraged deeper latching. Continue cares.

## 2019-04-06 NOTE — PLAN OF CARE
Data: Vital signs WDL except BP which is  slightly elevated and Lab will be drawn this morning. Postpartum checks WDL  Patient eating and drinking normally. Patient berg was removed at 0000 and was able to empty bladder with assist. Patient performing self cares and is able to care for infant. Breastfeeding on demand. EDS=0.  Action: Patient medicated during the shift for pain with Tylenol, Oxycodone and Ibuprofen with relief after 1 hour. Patient education done see education record.  Response: Positive attachment behaviors observed with infant. Support persons  present.    Plan: Continue with the plan of care

## 2019-04-07 PROCEDURE — 12000001 ZZH R&B MED SURG/OB UMMC

## 2019-04-07 PROCEDURE — 25000132 ZZH RX MED GY IP 250 OP 250 PS 637: Performed by: STUDENT IN AN ORGANIZED HEALTH CARE EDUCATION/TRAINING PROGRAM

## 2019-04-07 PROCEDURE — 25000131 ZZH RX MED GY IP 250 OP 636 PS 637: Performed by: STUDENT IN AN ORGANIZED HEALTH CARE EDUCATION/TRAINING PROGRAM

## 2019-04-07 RX ORDER — OXYCODONE HYDROCHLORIDE 5 MG/1
5-10 TABLET ORAL EVERY 6 HOURS PRN
Qty: 10 TABLET | Refills: 0 | Status: SHIPPED | OUTPATIENT
Start: 2019-04-07 | End: 2020-01-02

## 2019-04-07 RX ADMIN — ACETAMINOPHEN 975 MG: 325 TABLET, FILM COATED ORAL at 23:23

## 2019-04-07 RX ADMIN — ACETAMINOPHEN 975 MG: 325 TABLET, FILM COATED ORAL at 06:17

## 2019-04-07 RX ADMIN — OXYCODONE HYDROCHLORIDE 5 MG: 5 TABLET ORAL at 18:45

## 2019-04-07 RX ADMIN — ACETAMINOPHEN 975 MG: 325 TABLET, FILM COATED ORAL at 14:45

## 2019-04-07 RX ADMIN — SENNOSIDES AND DOCUSATE SODIUM 1 TABLET: 8.6; 5 TABLET ORAL at 09:16

## 2019-04-07 RX ADMIN — IBUPROFEN 600 MG: 600 TABLET ORAL at 19:18

## 2019-04-07 RX ADMIN — AZATHIOPRINE 100 MG: 50 TABLET ORAL at 09:26

## 2019-04-07 RX ADMIN — IBUPROFEN 600 MG: 600 TABLET ORAL at 13:10

## 2019-04-07 RX ADMIN — OXYCODONE HYDROCHLORIDE 5 MG: 5 TABLET ORAL at 03:40

## 2019-04-07 RX ADMIN — OXYCODONE HYDROCHLORIDE 5 MG: 5 TABLET ORAL at 09:08

## 2019-04-07 RX ADMIN — SENNOSIDES AND DOCUSATE SODIUM 1 TABLET: 8.6; 5 TABLET ORAL at 09:11

## 2019-04-07 RX ADMIN — IBUPROFEN 600 MG: 600 TABLET ORAL at 06:17

## 2019-04-07 RX ADMIN — SENNOSIDES AND DOCUSATE SODIUM 1 TABLET: 8.6; 5 TABLET ORAL at 19:21

## 2019-04-07 NOTE — DOWNTIME EVENT NOTE
The EMR was down for 3 hours on 4/7/2019.    This writer was responsible for completing the paper charting during this time period.     The following information was re-entered into the system by Shanon Genao: MAR    The following information will remain in the paper chart: LUCRECIA Genao  4/7/2019

## 2019-04-07 NOTE — PLAN OF CARE
Patients vitals have been stable. Postpartum assessment WDL. Declines headache, blurred vision, and dizziness. States that she is voiding without difficulties. Is taking ibuprofen, tylenol, and oxycodone for pain. Has been up walking around in the halls this evening. Incision is closed with steri strips and is clean, dry, and intact. Wearing an abdominal binder. Will continue to monitor for adequate pain control.

## 2019-04-07 NOTE — PLAN OF CARE
Pain is well controlled with current pain regimen. Ambulating well in the hallway. Breastfeeding independently and latch was observed. Does hand expression with result. Will continue with plan of care.

## 2019-04-07 NOTE — PROGRESS NOTES
OB Postpartum Progress Note    S: Doing well. Pain well controlled on orals. Lochia minimal. Tolerating regular diet without nausea or emesis. Passing flatus, no BM yet. Ambulating without dizziness or lightheadedness. Voiding spontaneously without issues. Breastfeeding going okay. No headache, vision changes, CP, SOB, RUQ pain.    O:  /81   Pulse 76   Temp 98.4  F (36.9  C) (Oral)   Resp 16   LMP 2018   SpO2 99%   Breastfeeding? Unknown     Gen: NAD. Alert, oriented. Resting comfortably in bed.  CV: Regular rate, S1/S2 normal.  Resp: On room air, no increased work of breathing  Abd: Soft, appropriately tender, fundus firm at 1 cm below the umbilicus, appropriately tender  Incision: C/D/I, steristrips in place.  Ext: Trace lower extremity edema bilaterally    Labs:   Hemoglobin   Date Value Ref Range Status   2019 10.4 (L) 11.7 - 15.7 g/dL Final   2019 11.1 (L) 11.7 - 15.7 g/dL Final     Creatinine   Date Value Ref Range Status   2019 0.56 0.52 - 1.04 mg/dL Final     ALT   Date Value Ref Range Status   2019 18 0 - 50 U/L Final     AST   Date Value Ref Range Status   2019 44 0 - 45 U/L Final       A/P:   Citlalli Martin is a 28 year old  who is POD#2 s/p RLTCS. Pregnancy notable for Crohn's well controlled on azathioprine, lupus - no meds, new peripartum gHTN. Doing well postpartum. BP wnl overnight, VSS.    # gHTN  Peripartum diagnosis with DBP >90 x 2, 4 hours apart.  - HELLP labs wnl  - BPs 113-120 / 81-86 in the last 24 hours  - BP re-check in 3-5 days after discharge + routine 6 week follow-up    # Crohn's  - continue PTA azathiprine    # Postpartum/Postop  - Pain: Continue scheduled tylenol and prn ibuprofen, oxycodone  - Heme: Hgb 11.2 >  ml > 10.4.   - GI: Senna BID, simethicone TID, miralax, suppository, anti-emetics  - : s/p Park now voiding well.   - PNC: Rh positive, Rubella immune  - Breast-feeding  - Contraception: Desires IUD at 6  weeks PP. Discussed recommended 18 month spacing prior to next pregnancy.  - PPx: Encourage ambulation, IS, SCDs while confined to bed    Anticipate discharge today or tomorrow (POD #2-3).     Fannie Lr MD  [facility] Medicine PGY-1  Pager [phone]     Attestation:   This patient was seen and evaluated by me, separately from the house staff team. I have reviewed the note/plan above and agree.     Doing well and would like to go home today. Has been taking occasional oxy so will send with #10 tablets.  BP's are normal and plan RTC 6 weeks for pp visit. Discussed doesn't need Fe anymore. Questions answered and discharge instructions reviewed.    Jody Schrader MD      Addendum: baby with large weight loss and will need to stay until tomorrow so pt requests no discharge until POD #3 to work on breastfeeding.  Will plan for discharge home tomorrow.    Jody Schrader MD

## 2019-04-08 VITALS
HEART RATE: 84 BPM | TEMPERATURE: 98.4 F | OXYGEN SATURATION: 99 % | DIASTOLIC BLOOD PRESSURE: 89 MMHG | RESPIRATION RATE: 18 BRPM | SYSTOLIC BLOOD PRESSURE: 133 MMHG

## 2019-04-08 PROCEDURE — 25000132 ZZH RX MED GY IP 250 OP 250 PS 637: Performed by: STUDENT IN AN ORGANIZED HEALTH CARE EDUCATION/TRAINING PROGRAM

## 2019-04-08 RX ADMIN — IBUPROFEN 600 MG: 600 TABLET ORAL at 09:19

## 2019-04-08 RX ADMIN — OXYCODONE HYDROCHLORIDE 5 MG: 5 TABLET ORAL at 11:49

## 2019-04-08 RX ADMIN — ACETAMINOPHEN 975 MG: 325 TABLET, FILM COATED ORAL at 07:08

## 2019-04-08 RX ADMIN — IBUPROFEN 600 MG: 600 TABLET ORAL at 02:50

## 2019-04-08 RX ADMIN — SENNOSIDES AND DOCUSATE SODIUM 1 TABLET: 8.6; 5 TABLET ORAL at 09:19

## 2019-04-08 RX ADMIN — OXYCODONE HYDROCHLORIDE 5 MG: 5 TABLET ORAL at 03:36

## 2019-04-08 NOTE — PLAN OF CARE
Data: Vital signs and postpartum checks WDL  Patient eating and drinking normally. Patient able to empty bladder independently and is up ambulating.  Patient performing self cares and is able to care for infant. Breastfeeding on demand and also supplementing with donor milk. Doing hand expression and pumping every other feeding.  Action: Patient medicated during the shift for pain with Tylenol, Oxycodone and Ibuprofen with relief after 1 hour. Patient education done see education record.  Response: Positive attachment behaviors observed with infant. Support persons  present.    Plan: Continue with the plan of care. Possible discharge today.

## 2019-04-08 NOTE — DISCHARGE INSTRUCTIONS
No heavy lifting (greater than 10-15 lbs) for 6 weeks  No driving for 2 weeks or while taking narcotic pain medications  Call our clinic if ever getting worse instead of better    Return to clinic in 6 weeks for a postpartum check  Postop  Birth Instructions    Activity       Do not lift more than 10 pounds for 6 weeks after surgery.  Ask family and friends for help when you need it.    No driving until you have stopped taking your pain medications (usually two weeks after surgery).    No heavy exercise or activity for 6 weeks.  Don't do anything that will put a strain on your surgery site.    Don't strain when using the toilet.  Your care team may prescribe a stool softener if you have problems with your bowel movements.     To care for your incision:       Keep the incision clean and dry.    Do not soak your incision in water. No swimming or hot tubs until it has fully healed. You may soak in the bathtub if the water level is below your incision.    Do not use peroxide, gel, cream, lotion, or ointment on your incision.    Adjust your clothes to avoid pressure on your surgery site (check the elastic in your underwear for example).     You may see a small amount of clear or pink drainage and this is normal.  Check with your health care provider:       If the drainage increases or has an odor.    If the incision reddens, you have swelling, or develop a rash.    If you have increased pain and the medicine we prescribed doesn't help.    If you have a fever above 100.4 F (38 C) with or without chills when placing thermometer under your tongue.   The area around your incision (surgery wound), will feel numb.  This is normal. The numbness should go away in less than a year.     Keep your hands clean:  Always wash your hands before touching your incision (surgery wound). This helps reduce your risk of infection. If your hands aren't dirty, you may use an alcohol hand-rub to clean your hands. Keep your nails clean  and short.    Call your healthcare provider if you have any of these symptoms:       You soak a sanitary pad with blood within 1 hour, or you see blood clots larger than a golf ball.    Bleeding that lasts more than 6 weeks.    Vaginal discharge that smells bad.    Severe pain, cramping or tenderness in your lower belly area.    A need to urinate more frequently (use the toilet more often), more urgently (use the toilet very quickly), or it burns when you urinate.    Nausea and vomiting.    Redness, swelling or pain around a vein in your leg.    Problems breastfeeding or a red or painful area on your breast.    Chest pain and cough or are gasping for air.    Problems with coping with sadness, anxiety or depression. If you have concerns about hurting yourself or the baby, call your provider immediately.      You have questions or concerns after you return home.

## 2019-04-08 NOTE — PLAN OF CARE
VSS and postpartum assessments WDL.  Up ad jessie with steady gait.  Independent with cares.  Bonding well with infant.  Breastfeeding on cue independently with great latch observed.  Infant being supplemented via SNS at the breast with mother's expressed/pumped colostrum and donor breastmilk, father involved in SNS feedings.  Pain managed with tylenol, ibuprofen and oxycodone per MAR.  Incisional steristrips intact.  Using abdominal binder.  , Frederick present and very supportive.  Will continue with postpartum cares and education per plan of care and planning to discharge home tomorrow.

## 2019-04-08 NOTE — PROGRESS NOTES
OB Postpartum Progress Note    S: Doing well. Pain well controlled on oral medications.  Lochia minimal. Tolerating regular diet without nausea or emesis. Passing flatus, no BM yet. Ambulating without dizziness or lightheadedness. Voiding spontaneously without issues. Breastfeeding going well. No headache, vision changes, CP, SOB, RUQ pain.    O:  Vitals:    19 1636 19 2343 19 0915 19 1730   BP: 123/86 113/81 125/88 124/76   Pulse:  76 84    Resp: 16 16 16 16   Temp: 98.1  F (36.7  C) 98.4  F (36.9  C) 98.3  F (36.8  C) 98.3  F (36.8  C)   TempSrc: Oral Oral Oral Oral   SpO2:           Gen: NAD. Alert. Resting comfortably in bed.  CV: Regular rate  Resp: On room air, no increased work of breathing  Abd: Soft, appropriately tender, fundus firm at 1 cm below the umbilicus, appropriately tender  Incision: C/D/I, steristrips in place.  Ext: Trace lower extremity edema bilaterally    Labs:   Hemoglobin   Date Value Ref Range Status   2019 10.4 (L) 11.7 - 15.7 g/dL Final   2019 11.1 (L) 11.7 - 15.7 g/dL Final     Creatinine   Date Value Ref Range Status   2019 0.56 0.52 - 1.04 mg/dL Final     ALT   Date Value Ref Range Status   2019 18 0 - 50 U/L Final     AST   Date Value Ref Range Status   2019 44 0 - 45 U/L Final       A/P:   Citlalli Martin is a 28 year old  who is POD#3 s/p RLTCS. Pregnancy notable for Crohn's well controlled on azathioprine, lupus - no meds, new peripartum gHTN. Doing well postpartum. BP wnl overnight, VSS.    # gHTN  Peripartum diagnosis with DBP >90 x 2, 4 hours apart.  - HELLP labs wnl  - BPs normotensive  - BP re-check in 3-5 days after discharge + routine 6 week follow-up    # Crohn's, stable  - continue PTA azathiprine    # Postpartum/Postop  - Pain: Continue scheduled tylenol and prn ibuprofen, oxycodone  - Heme: Hgb 11.2 >  ml > 10.4.   - GI: Senna BID, simethicone TID, miralax, suppository, anti-emetics  - : s/p  Park now voiding well.   - PNC: Rh positive, Rubella immune  - Breast-feeding  - Contraception: Desires IUD at 6 weeks PP. Discussed recommended 18 month spacing prior to next pregnancy.  - PPx: Encourage ambulation, IS, SCDs while confined to bed    Anticipate discharge today    Colt Winters MD  Obstetrics & Gynecology, PGY2      Physician Attestation   I, Alanna River, personally examined and evaluated this patient.  I discussed the patient with the medical student and/or resident and care team, and agree with the assessment and plan of care as documented in the note of 2019  [date].      I personally reviewed vital signs, medications, labs and exam.    Key findings: 28 year old  on POD 2 s/p RLTCS. Doing well. Reviewed discharge instructions including activity restrictions, pelvic rest and follow up plan. Reviewed signs of excessive bleeding, infection, postpartum pre-eclampsia, mastitis, DVT and postpartum depression - instructed patient to call if concerned about any of these or other concerns. Patient to follow up in 6 weeks for routine postpartum visit, planning IUD for contraception. All questions answered.     Alanna River MD  Date of Service (when I saw the patient): 19

## 2019-04-08 NOTE — PLAN OF CARE
Data: Vital signs stable, assessments within normal limits. Discharge instruction given and instructed of signs/symptoms to look for and report per discharge instructions. Discharge outcomes on care plan met. No apparent pain.   Action: Review of care plan, teaching, and discharge instructions done with patient. verification with signature obtained.   Response: patient  states understanding and comfort with self cares. All questions about self care addressed. patient discharged with infant  at 1058.

## 2019-06-13 ENCOUNTER — PRENATAL OFFICE VISIT (OUTPATIENT)
Dept: OBGYN | Facility: CLINIC | Age: 29
End: 2019-06-13
Payer: COMMERCIAL

## 2019-06-13 VITALS
BODY MASS INDEX: 22.44 KG/M2 | HEART RATE: 85 BPM | SYSTOLIC BLOOD PRESSURE: 126 MMHG | WEIGHT: 139 LBS | TEMPERATURE: 97.9 F | DIASTOLIC BLOOD PRESSURE: 78 MMHG

## 2019-06-13 DIAGNOSIS — Z30.430 ENCOUNTER FOR IUD INSERTION: Primary | ICD-10-CM

## 2019-06-13 LAB — HCG UR QL: NEGATIVE

## 2019-06-13 PROCEDURE — 81025 URINE PREGNANCY TEST: CPT | Performed by: OBSTETRICS & GYNECOLOGY

## 2019-06-13 PROCEDURE — 58300 INSERT INTRAUTERINE DEVICE: CPT | Performed by: OBSTETRICS & GYNECOLOGY

## 2019-06-13 PROCEDURE — 99207 ZZC POST PARTUM EXAM: CPT | Performed by: OBSTETRICS & GYNECOLOGY

## 2019-06-13 RX ORDER — COPPER 313.4 MG/1
1 INTRAUTERINE DEVICE INTRAUTERINE ONCE
Qty: 1 EACH | Refills: 0
Start: 2019-06-13 | End: 2019-06-13

## 2019-06-13 ASSESSMENT — PATIENT HEALTH QUESTIONNAIRE - PHQ9: SUM OF ALL RESPONSES TO PHQ QUESTIONS 1-9: 0

## 2019-06-13 NOTE — NURSING NOTE
"Chief Complaint   Patient presents with     IUD     NDC: [address]-8 LOT#: [id number] EXP:2025     Post Partum Exam       Initial /78   Pulse 85   Temp 97.9  F (36.6  C) (Oral)   Wt 63 kg (139 lb)   BMI 22.44 kg/m   Estimated body mass index is 22.44 kg/m  as calculated from the following:    Height as of 18: 1.676 m (5' 6\").    Weight as of this encounter: 63 kg (139 lb).  BP completed using cuff size: regular    Questioned patient about current smoking habits.  Pt. has never smoked.          The following HM Due: NONE      The following patient reported/Care Every where data was sent to:  P ABSTRACT QUALITY INITIATIVES [[id number]]  NA      n/a              "

## 2019-06-14 NOTE — PROGRESS NOTES
OB GYN CLINIC VISIT  2019    CC: postpartum visit    SUBJECTIVE:  Citlalli Martin is a 29 year old female  here for a postpartum visit.  She had a Repeat  Section  on 19 delivering a healthy baby girl weighing 7 lbs 12 oz at 39w5d.      PHQ-9 SCORE 2018   PHQ-9 Total Score 0 0 0     No flowsheet data found.      delivery complications:  No  breast feeding:  Yes  bladder problems:  No  bowel problems/hemorrhoids:  No  episiotomy/laceration/incision healed? Yes. About 1 month ago, there was a small opening that drained pus in the midline, has resolved and healed  vaginal flow:  None  [place]:  Yes. Some discomfort  contraception:  IUD  emotional adjustment:  doing well, happy and tired  back to work:  In 1 month    Current Outpatient Medications   Medication     AZATHIOPRINE PO     Misc. Devices (BREAST PUMP) MISC     Prenatal Vit-Fe Fumarate-FA (PRENATAL MULTIVITAMIN  PLUS IRON) 27-0.8 MG TABS per tablet     acetaminophen (TYLENOL) 325 MG tablet     ibuprofen (ADVIL/MOTRIN) 600 MG tablet     oxyCODONE (ROXICODONE) 5 MG tablet     senna-docusate (SENOKOT-S/PERICOLACE) 8.6-50 MG tablet     No current facility-administered medications for this visit.        OBJECTIVE:  Blood pressure 126/78, pulse 85, temperature 97.9  F (36.6  C), temperature source Oral, weight 63 kg (139 lb), unknown if currently breastfeeding.   General - pleasant female in no acute distress.  Breast - no nodularity, asymmetry or nipple discharge bilaterally.  Abdomen - soft, nontender, nondistended, no hepatosplenomegaly.  Pelvic - EG: normal adult female, BUS: within normal limits, Vagina: well rugated, no discharge, Cervix: normal, no lesions or CMT, Uterus: firm, normal sized and nontender, Adnexae: no masses or tenderness.  Rectovaginal - deferred.    PROCEDURE: IUD insertion  Patient has verbalized understanding of risks and benefits. She was counseled on risks of infection, bleeding,  uterine perforation, cervical laceration, expulsion, overall risk of pregnancy 2 in 1000, if she does get pregnant that there is an increased risk of ectopic pregnancy. All questions answered. She has signed the consent form.    Urine pregnancy test was negative.      A medium radha speculum was placed in the vagina with good visualization of the cervix.  The cervix was then swabbed with a betadine x3.  Tenaculum was placed at the 12 o'clock position on the cervix and the uterus sounded to 7.5cm.  The Paragard T-380  IUD was then placed in the usual fashion under sterile technique without difficulty.  Strings were clipped about 2-3 cm from the cervical os.  Tenaculum was removed and cervix was hemostatic. There were no complications. The patient tolerated the procedure well.    NDC: [address]-3 LOT#: [id number] EXP:2025      ASSESSMENT:  29 year old  with normal postpartum exam    PLAN:  1. Encounter for IUD insertion  The patient should feel for the IUD strings in 2 weeks.  If unable to locate them, she should return to clinic for a speculum examination for confirmation that the IUD is in place. Bleeding pattern of this particular IUD was discussed with the patient. She is aware that the IUD will need to be removed in 10 years or sooner PRN.  She is to return to clinic for her next annual or PRN.  - HCG Qual, Urine ([id number])  - paragard intrauterine copper device; 1 each by Intrauterine route once for 1 dose  Dispense: 1 each; Refill: 0  - INSERTION INTRAUTERINE DEVICE  - IUD PARAGARD    2. Routine postpartum follow-up  Discussed kegel exercises   May resume normal activities without restrictions  Pap smear was not  done today    The patient will use paragard IUD for birth control. Full counseling was provided, and all questions answered. Compliance is strongly emphasized.  Return to clinic in one year for an annual, when due for a pap smear or PRN.    Alanna River MD

## 2019-07-01 ENCOUNTER — TELEPHONE (OUTPATIENT)
Dept: OBGYN | Facility: CLINIC | Age: 29
End: 2019-07-01

## 2019-07-01 NOTE — TELEPHONE ENCOUNTER
Patient calling with concerns of possible thrush on breast. Pt is breast feeding. Baby has thrush. Advised that pt be seen for evaluation. Pt is going to try to get appt at [facility].   Esther Osullivan RN-BSN

## 2020-01-02 ENCOUNTER — OFFICE VISIT (OUTPATIENT)
Dept: FAMILY MEDICINE | Facility: CLINIC | Age: 30
End: 2020-01-02
Payer: COMMERCIAL

## 2020-01-02 VITALS
BODY MASS INDEX: 19.73 KG/M2 | HEIGHT: 66 IN | OXYGEN SATURATION: 98 % | SYSTOLIC BLOOD PRESSURE: 121 MMHG | WEIGHT: 122.8 LBS | HEART RATE: 86 BPM | TEMPERATURE: 98 F | DIASTOLIC BLOOD PRESSURE: 81 MMHG

## 2020-01-02 DIAGNOSIS — J06.9 UPPER RESPIRATORY TRACT INFECTION, UNSPECIFIED TYPE: Primary | ICD-10-CM

## 2020-01-02 LAB
FLUAV+FLUBV AG SPEC QL: NEGATIVE
FLUAV+FLUBV AG SPEC QL: NEGATIVE
SPECIMEN SOURCE: NORMAL

## 2020-01-02 PROCEDURE — 87804 INFLUENZA ASSAY W/OPTIC: CPT | Mod: 59 | Performed by: NURSE PRACTITIONER

## 2020-01-02 PROCEDURE — 99203 OFFICE O/P NEW LOW 30 MIN: CPT | Performed by: NURSE PRACTITIONER

## 2020-01-02 RX ORDER — OSELTAMIVIR PHOSPHATE 75 MG/1
75 CAPSULE ORAL 2 TIMES DAILY
Qty: 10 CAPSULE | Refills: 0 | Status: SHIPPED | OUTPATIENT
Start: 2020-01-02 | End: 2020-01-07

## 2020-01-02 ASSESSMENT — ENCOUNTER SYMPTOMS
WHEEZING: 0
DIARRHEA: 0
COUGH: 1
RHINORRHEA: 1
FEVER: 0
SINUS PRESSURE: 1
DIZZINESS: 0
VOMITING: 0
EYE ITCHING: 1
CHILLS: 1
TROUBLE SWALLOWING: 0
NAUSEA: 0
SHORTNESS OF BREATH: 0
SORE THROAT: 1
ABDOMINAL PAIN: 0
HEADACHES: 0
FATIGUE: 1

## 2020-01-02 ASSESSMENT — MIFFLIN-ST. JEOR: SCORE: 1284.8

## 2020-01-02 NOTE — PROGRESS NOTES
Subjective     Citlalli Martin is a 29 year old female who presents to clinic today for the following health issues:    HPI   RESPIRATORY SYMPTOMS      Duration: 2 days    Description  nasal congestion, rhinorrhea, sore throat, facial pain/pressure, cough, chills, headache, fatigue/malaise, myalgias and conjunctival irritation    Severity: moderate    Accompanying signs and symptoms: None    History (predisposing factors):  none    Precipitating or alleviating factors: sitting up helps    Therapies tried and outcome:  none    Denies any known fever or known sick contacts.  Has not taken any OTC medications for symptoms.        Patient Active Problem List   Diagnosis     CARDIOVASCULAR SCREENING; LDL GOAL LESS THAN 160     Crohn's disease (H)     Lupus (H)     Iron deficiency anemia     Other closed fractures of distal end of radius (alone)     Undiagnosed cardiac murmurs     Need for Tdap vaccination     Previous  delivery, antepartum condition or complication     Maternal Crohn's disease affecting pregnancy (H)     Cervical cancer screening     Status post      Past Surgical History:   Procedure Laterality Date     APPENDECTOMY      with resection for Crohns      SECTION N/A 2016    Procedure:  SECTION;  Surgeon: Etta Em MD;  Location: [facility]      SECTION N/A 2019    Procedure: REPEAT  SECTION;  Surgeon: Chen Prajapati MD;  Location: [facility]     SMALL BOWEL RESECTION      related to Crohns       Social History     Tobacco Use     Smoking status: Never Smoker     Smokeless tobacco: Never Used   Substance Use Topics     Alcohol use: No     Alcohol/week: 0.0 standard drinks     Family History   Problem Relation Age of Onset     Diabetes Father         type 2     Arthritis Father      Hypertension Father      Hyperlipidemia Father      Bipolar Disorder Mother      Coronary Artery Disease Paternal Grandfather      Heart Failure Paternal  "Grandfather      Cerebrovascular Disease Paternal Grandmother      Chronic Obstructive Pulmonary Disease Paternal Grandmother          Current Outpatient Medications   Medication Sig Dispense Refill     acetaminophen (TYLENOL) 325 MG tablet Take 2 tablets (650 mg) by mouth every 4 hours as needed for other (multimodal surgical pain management along with NSAIDS and opioid medication as indicated based on pain control and physical function.) 100 tablet 0     AZATHIOPRINE PO Take 100 mg by mouth daily        Prenatal Vit-Fe Fumarate-FA (PRENATAL MULTIVITAMIN  PLUS IRON) 27-0.8 MG TABS per tablet Take 1 tablet by mouth daily       ibuprofen (ADVIL/MOTRIN) 600 MG tablet Take 1 tablet (600 mg) by mouth every 6 hours as needed for other (cramping) (Patient not taking: Reported on 1/2/2020) 60 tablet 0     Allergies   Allergen Reactions     Contrast Dye Nausea and Vomiting     Diagnostic X-Ray Materials      No Clinical Screening - See Comments Rash       Reviewed and updated as needed this visit by Provider  Tobacco  Allergies  Meds  Problems  Med Hx  Surg Hx  Fam Hx         Review of Systems   Constitutional: Positive for chills and fatigue. Negative for fever.   HENT: Positive for congestion, postnasal drip, rhinorrhea, sinus pressure and sore throat. Negative for ear pain and trouble swallowing.    Eyes: Positive for itching.   Respiratory: Positive for cough. Negative for shortness of breath and wheezing.    Cardiovascular: Negative for chest pain.   Gastrointestinal: Negative for abdominal pain, diarrhea, nausea and vomiting.   Neurological: Negative for dizziness and headaches.            Objective    /81   Pulse 86   Temp 98  F (36.7  C) (Oral)   Ht 1.67 m (5' 5.75\")   Wt 55.7 kg (122 lb 12.8 oz)   SpO2 98%   BMI 19.97 kg/m    Body mass index is 19.97 kg/m .  Physical Exam  Vitals signs reviewed.   Constitutional:       Appearance: She is well-developed.   HENT:      Head: Normocephalic.      " Right Ear: Tympanic membrane normal.      Left Ear: Tympanic membrane normal.      Nose: Nose normal.      Mouth/Throat:      Lips: Pink.      Mouth: Mucous membranes are moist.      Pharynx: Oropharynx is clear. No posterior oropharyngeal erythema.   Eyes:      General: Lids are normal.      Conjunctiva/sclera:      Left eye: Left conjunctiva is injected.   Cardiovascular:      Rate and Rhythm: Normal rate and regular rhythm.   Pulmonary:      Effort: Pulmonary effort is normal.      Breath sounds: Normal breath sounds.   Skin:     General: Skin is warm and dry.   Neurological:      Mental Status: She is alert and oriented to person, place, and time.            Diagnostic Test Results:  Labs reviewed in Epic  Influenza A/B: A: Negative    B: Negative        Assessment & Plan     1. Upper respiratory tract infection, unspecified type  - symptoms consistent with Influenza.  Will go ahead and treat as such.  Recommend Tylenol/Ibuprofen prn pain/fever.  Push fluids.   - Influenza A/B antigen  - oseltamivir (TAMIFLU) 75 MG capsule; Take 1 capsule (75 mg) by mouth 2 times daily for 5 days  Dispense: 10 capsule; Refill: 0       Return in about 1 week (around 1/9/2020) for worsening symptoms or failure to improve.    Jaclyn Giron NP  [facility]

## 2020-08-06 NOTE — PROGRESS NOTES
"Date: 2020 08:49:10  Clinician: Kilo Levy  Clinician NPI: [id number]  Patient: Citlalli Martin  Patient : 1990  Patient Address: [address] [place] [id number]  Patient Phone: [phone]  Visit Protocol: URI  Patient Summary:  Citlalli is a 30 year old ( : 1990 ) female who initiated a Visit for COVID-19 (Coronavirus) evaluation and screening. When asked the question \"Please sign me up to receive news, health information and promotions. \", Citlalli responded \"No\".    Citlalli states her symptoms started 1-2 days ago.   Her symptoms consist of a headache, enlarged lymph nodes, a sore throat, nasal congestion, rhinitis, and malaise.   Symptom details     Nasal secretions: The color of her mucus is clear.    Sore throat: Citlalli reports having mild throat pain (1-3 on a 10 point pain scale), does not have exudate on her tonsils, and can swallow liquids. The lymph nodes in her neck are enlarged. A rash has not appeared on the skin since the sore throat started.     Headache: She states the headache is mild (1-3 on a 10 point pain scale).      Citlalli denies having ear pain, chills, nausea, teeth pain, ageusia, diarrhea, cough, vomiting, myalgias, anosmia, facial pain or pressure, fever, and wheezing. She also denies taking antibiotic medication in the past month and having recent facial or sinus surgery in the past 60 days. She is not experiencing dyspnea.   Precipitating events  Within the past week, Citlalli has not been exposed to someone with strep throat.   Pertinent COVID-19 (Coronavirus) information  In the past 14 days, Citlalli has not worked in a congregate living setting.   She either works or volunteers as a healthcare worker or a , or works or volunteers in a healthcare facility. 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 She lives with a healthcare worker.   Citlalli has not had a close contact with a laboratory-confirmed COVID-19 patient within 14 days of symptom onset.   Since December 2019, Citlalli and has had upper respiratory infection (URI) or influenza-like illness. Has not been diagnosed with lab-confirmed COVID-19 test      Date(s) of previous URI or influenza-like illness (free-text): 2/2020- 3/2020 (tested negative for COVID antibodies 7/15/2020)     Symptoms Citlalli experienced during previous URI or influenza-like illness as reported by the patient (free-text): cold symptoms- cough/congestion        Pertinent medical history  Citlalli does not get yeast infections when she takes antibiotics.   Citlalli needs a return to work/school note.   Weight: 120 lbs   Citlalli does not smoke or use smokeless tobacco.   She denies pregnancy and denies breastfeeding. She has menstruated in the past month.   Additional information as reported by the patient (free text): I qualified to be tested for COVID through my employer ([employer]) but i am needing to be tested closer to home. I need the test completed before returning to work.   Weight: 120 lbs    MEDICATIONS: azathioprine oral, ALLERGIES: NKDA  Clinician Response:  Dear Citlalli,   Your symptoms show that you may have coronavirus (COVID-19). This illness can cause fever, cough and trouble breathing. Many people get a mild case and get better on their own. Some people can get very sick.  What should I do?  We would like to test you for this virus.   1. Please call [phone] to schedule your visit. Explain that you were referred by [facility] to have a COVID-19 test. Be ready to share your [facility] visit ID number.  The following will serve as your written order for this COVID Test, ordered by me, for the indication of suspected COVID [Z20.828]: The test will be ordered in [employer], our electronic health record, after you are  "scheduled. It will show as ordered and authorized by Peyman Laird MD.  Order: COVID-19 (Coronavirus) PCR for SYMPTOMATIC testing from [facility].      2. When it's time for your COVID test:  Stay at least 6 feet away from others. (If someone will drive you to your test, stay in the backseat, as far away from the  as you can.)   Cover your mouth and nose with a mask, tissue or washcloth.  Go straight to the testing site. Don't make any stops on the way there or back.      3.Starting now: Stay home and away from others (self-isolate) until:   You've had no fever---and no medicine that reduces fever---for one full day (24 hours). And...   Your other symptoms have gotten better. For example, your cough or breathing has improved. And...   At least 10 days have passed since your symptoms started.       During this time, don't leave the house except for testing or medical care.   Stay in your own room, even for meals. Use your own bathroom if you can.   Stay away from others in your home. No hugging, kissing or shaking hands. No visitors.  Don't go to work, school or anywhere else.    Clean \"high touch\" surfaces often (doorknobs, counters, handles, etc.). Use a household cleaning spray or wipes. You'll find a full list of  on the EPA website: www.epa.gov/pesticide-registration/list-n-disinfectants-use-against-sars-cov-2.   Cover your mouth and nose with a mask, tissue or washcloth to avoid spreading germs.  Wash your hands and face often. Use soap and water.  Caregivers in these groups are at risk for severe illness due to COVID-19:  o People 65 years and older  o People who live in a nursing home or long-term care facility  o People with chronic disease (lung, heart, cancer, diabetes, kidney, liver, immunologic)  o People who have a weakened immune system, including those who:   Are in cancer treatment  Take medicine that weakens the immune system, such as corticosteroids  Had a bone marrow or organ transplant  " Have an immune deficiency  Have poorly controlled HIV or AIDS  Are obese (body mass index of 40 or higher)  Smoke regularly   o Caregivers should wear gloves while washing dishes, handling laundry and cleaning bedrooms and bathrooms.  o Use caution when washing and drying laundry: Don't shake dirty laundry, and use the warmest water setting that you can.  o For more tips, go to www.cdc.gov/coronavirus/2019-ncov/downloads/10Things.pdf.    4.Sign up for [employer]. We know it's scary to hear that you might have COVID-19. We want to track your symptoms to make sure you're okay over the next 2 weeks. Please look for an email from [employer]---this is a free, online program that we'll use to keep in touch. To sign up, follow the link in the email. Learn more at [email or web address]  How can I take care of myself?   Get lots of rest. Drink extra fluids (unless a doctor has told you not to).   Take Tylenol (acetaminophen) for fever or pain. If you have liver or kidney problems, ask your family doctor if it's okay to take Tylenol.   Adults can take either:    650 mg (two 325 mg pills) every 4 to 6 hours, or...   1,000 mg (two 500 mg pills) every 8 hours as needed.    Note: Don't take more than 3,000 mg in one day. Acetaminophen is found in many medicines (both prescribed and over-the-counter medicines). Read all labels to be sure you don't take too much.   For children, check the Tylenol bottle for the right dose. The dose is based on the child's age or weight.    If you have other health problems (like cancer, heart failure, an organ transplant or severe kidney disease): Call your specialty clinic if you don't feel better in the next 2 days.       Know when to call 911. Emergency warning signs include:    Trouble breathing or shortness of breath Pain or pressure in the chest that doesn't go away Feeling confused like you haven't felt before, or not being able to wake up Bluish-colored lips or face.  Where  can I get more information?   [facility] -- About COVID-19: [email or web address]   CDC -- What to Do If You're Sick: www.cdc.gov/coronavirus/2019-ncov/about/steps-when-sick.html   CDC -- Ending Home Isolation: www.cdc.gov/coronavirus/2019-ncov/hcp/disposition-in-home-patients.html   [facility] -- Caring for Someone: www.cdc.gov/coronavirus/2019-ncov/if-you-are-sick/care-for-someone.html   [facility] -- Interim Guidance for Hospital Discharge to Home: www.health.[facility].[place].us/diseases/coronavirus/hcp/hospdischarge.pdf   [facility] clinical trials (COVID-19 research studies): clinicalaffairs.[facility]-clinical-trials    Below are the COVID-19 hotlines at the [place] Department of Health ([facility]). Interpreters are available.    For health questions: Call 787-019-3227 or 1-852.554.7778 (7 a.m. to 7 p.m.) For questions about schools and childcare: Call 114-039-6784 or 1-212.647.9468 (7 a.m. to 7 p.m.)       Rapid Strep Test - [facility]    I am sorry you are not feeling well. Based on the information provided, it is possible you could have strep throat. When left untreated, strep can spread to other areas of the body and cause a more serious infection.  A strep test is the only way to determine if a bacterial infection or a virus is causing your sore throat. This is done in a lab where a swab of the back of your throat is collected and tested for the bacteria that causes strep.  Since you chose not to use the lab order, please be seen:     In a clinic or urgent care    Within 24 hours     Call [phone] or go to the emergency room if you suddenly develop a rash, are drooling or unable to swallow fluids, if you are having difficulty breathing, or feel that your throat is closing off.   Diagnosis: Pain in throat  Diagnosis ICD: R07.0  [employer] Results: Rapid Strep Test - [facility] - Declined  [employer] Secondary Results: null

## 2020-09-14 NOTE — PROGRESS NOTES
"Subjective     Citlalli Martin is a 30 year old female who presents to clinic today for the following health issues:    HPI     Irregular vaginal bleeding    Patient currently using Paragard IUD for the last year since delivery. Had 2 prior Paragard IUDs with no issues. Cycles are regular, monthly, last about 1 week. Since June, has had spotting between periods. Brown in color, not associated with cramping, clots, heavier flow. Denies urinary symptoms, other abnormal vaginal symptoms, pelvic pain. Overall is happy with the IUD. Prefers not to be on hormones. Strings feel normal. The amount of time the spotting is lasting seems to be progressing where she feels she does not have much of a break between bleeding more recently.   Due for pap smear and would like it done today.     Review of Systems   Constitutional, HEENT, cardiovascular, pulmonary, gi and gu systems are negative, except as otherwise noted.      Objective    /74 (BP Location: Right arm, Patient Position: Sitting, Cuff Size: Adult Regular)   Pulse 66   Temp 97.2  F (36.2  C) (Tympanic)   Ht 1.67 m (5' 5.75\")   Wt 55.9 kg (123 lb 3.2 oz)   LMP 08/20/2020 (Exact Date)   SpO2 100%   BMI 20.04 kg/m    Body mass index is 20.04 kg/m .  Physical Exam   GENERAL: healthy, alert and no distress  ABDOMEN: soft, nontender, no hepatosplenomegaly, no masses and bowel sounds normal   (female): normal female external genitalia, normal urethral meatus, vaginal mucosa, normal cervix/adnexa/uterus without masses or discharge-small amount of brown blood in the vagina, IUD strings visible and appropriate length  MS: no gross musculoskeletal defects noted, no edema  SKIN: no suspicious lesions or rashes  PSYCH: mentation appears normal, affect normal/bright    Assessment & Plan     Screening for cervical cancer  - Pap imaged thin layer screen with HPV - recommended age 30 - 65 years (select HPV order below)  - HPV High Risk Types DNA " Cervical    Breakthrough bleeding associated with intrauterine device (IUD)  We discussed her symptoms and exam. Plan pelvic ultrasound for additional evaluation and assess for structural etiology. Will follow up with patient once results available. Overall, likely prefers to keep IUD. Patient is given an opportunity to ask questions and have them answered.  - US Pelvic Complete with Transvaginal     DAYSI Del [facility]

## 2020-09-16 ENCOUNTER — OFFICE VISIT (OUTPATIENT)
Dept: OBGYN | Facility: CLINIC | Age: 30
End: 2020-09-16
Payer: COMMERCIAL

## 2020-09-16 VITALS
BODY MASS INDEX: 19.8 KG/M2 | OXYGEN SATURATION: 100 % | DIASTOLIC BLOOD PRESSURE: 74 MMHG | WEIGHT: 123.2 LBS | TEMPERATURE: 97.2 F | HEART RATE: 66 BPM | SYSTOLIC BLOOD PRESSURE: 111 MMHG | HEIGHT: 66 IN

## 2020-09-16 DIAGNOSIS — Z97.5 BREAKTHROUGH BLEEDING ASSOCIATED WITH INTRAUTERINE DEVICE (IUD): Primary | ICD-10-CM

## 2020-09-16 DIAGNOSIS — N83.201 BILATERAL OVARIAN CYSTS: ICD-10-CM

## 2020-09-16 DIAGNOSIS — N83.202 BILATERAL OVARIAN CYSTS: ICD-10-CM

## 2020-09-16 DIAGNOSIS — Z12.4 SCREENING FOR CERVICAL CANCER: ICD-10-CM

## 2020-09-16 DIAGNOSIS — N92.1 BREAKTHROUGH BLEEDING ASSOCIATED WITH INTRAUTERINE DEVICE (IUD): Primary | ICD-10-CM

## 2020-09-16 PROCEDURE — 99213 OFFICE O/P EST LOW 20 MIN: CPT | Performed by: NURSE PRACTITIONER

## 2020-09-16 PROCEDURE — G0145 SCR C/V CYTO,THINLAYER,RESCR: HCPCS | Performed by: NURSE PRACTITIONER

## 2020-09-16 PROCEDURE — 87624 HPV HI-RISK TYP POOLED RSLT: CPT | Performed by: NURSE PRACTITIONER

## 2020-09-16 RX ORDER — COPPER 313.4 MG/1
1 INTRAUTERINE DEVICE INTRAUTERINE ONCE
COMMUNITY
End: 2022-04-13

## 2020-09-16 ASSESSMENT — PAIN SCALES - GENERAL: PAINLEVEL: NO PAIN (0)

## 2020-09-16 ASSESSMENT — MIFFLIN-ST. JEOR: SCORE: 1281.61

## 2020-09-21 ENCOUNTER — ANCILLARY PROCEDURE (OUTPATIENT)
Dept: ULTRASOUND IMAGING | Facility: CLINIC | Age: 30
End: 2020-09-21
Attending: NURSE PRACTITIONER
Payer: COMMERCIAL

## 2020-09-21 LAB
COPATH REPORT: NORMAL
PAP: NORMAL

## 2020-09-21 PROCEDURE — 76830 TRANSVAGINAL US NON-OB: CPT

## 2020-09-21 PROCEDURE — 76856 US EXAM PELVIC COMPLETE: CPT

## 2020-11-16 ENCOUNTER — ANCILLARY PROCEDURE (OUTPATIENT)
Dept: ULTRASOUND IMAGING | Facility: CLINIC | Age: 30
End: 2020-11-16
Attending: NURSE PRACTITIONER
Payer: COMMERCIAL

## 2020-11-16 DIAGNOSIS — N83.202 BILATERAL OVARIAN CYSTS: ICD-10-CM

## 2020-11-16 DIAGNOSIS — N83.201 BILATERAL OVARIAN CYSTS: ICD-10-CM

## 2020-12-13 ENCOUNTER — HEALTH MAINTENANCE LETTER (OUTPATIENT)
Age: 30
End: 2020-12-13

## 2021-09-26 ENCOUNTER — HEALTH MAINTENANCE LETTER (OUTPATIENT)
Age: 31
End: 2021-09-26

## 2022-01-16 ENCOUNTER — HEALTH MAINTENANCE LETTER (OUTPATIENT)
Age: 32
End: 2022-01-16

## 2022-04-13 ENCOUNTER — OFFICE VISIT (OUTPATIENT)
Dept: OBGYN | Facility: CLINIC | Age: 32
End: 2022-04-13
Payer: COMMERCIAL

## 2022-04-13 VITALS
BODY MASS INDEX: 21.21 KG/M2 | DIASTOLIC BLOOD PRESSURE: 83 MMHG | SYSTOLIC BLOOD PRESSURE: 124 MMHG | TEMPERATURE: 98.7 F | WEIGHT: 130.4 LBS | HEART RATE: 73 BPM

## 2022-04-13 DIAGNOSIS — N83.209 CYST OF OVARY, UNSPECIFIED LATERALITY: Primary | ICD-10-CM

## 2022-04-13 PROCEDURE — 99213 OFFICE O/P EST LOW 20 MIN: CPT | Performed by: OBSTETRICS & GYNECOLOGY

## 2022-04-13 NOTE — PROGRESS NOTES
S; Citlalli Martin is a 31 year old  who has questions about an ovarian cyst recently found on MRI and also considering another pregnancy.    She had an MRI done for her crohns in  and was normal pelvic organs.  She had another done for work program and was found to have a 9cm simple ovarian cyst.  She and her  are trying to decide if they want another baby, so wondering if this cyst plays in to that at all, PCOS?, etc.  She denies any pain or symptoms.  She had a paraguard IUD placed after the birth of her last child in  and had it removed last  because she was having a lot of intermenstrual spotting.  Mostly brown and light.  eval was done and us were normal at that time.  No real change since IUD was removed.    O; /83   Pulse 73   Temp 98.7  F (37.1  C) (Oral)   Wt 59.1 kg (130 lb 6.4 oz)   LMP 2022   BMI 21.21 kg/m      Gen: NAD    Care everywhere reviewed, unable to see MRI report from  or images, but she has single image on phone and txt from radiologist letting her know if was 9cm, side unknown.    Reviewed visit note and 2 pelvic us from outside ob/gyn in 2020.    A/P: ovarian cyst, desires future pregnancy   We discussed that given the normal imagine report in , this is likely a functional cyst that commonly resolve spontaneously.  Given the larger size, it is possible it is something else, like a cystadenoma or a functional that just doesn't resolve and needs surgical removal.  I would recommend pelvic us now to eval the cyst and if stable or growing, we would need to discuss removal vs monitoring.   In terms of affect on pregnancy, I don't have concerns about the cyst, unless it was quite large and we would want to remove prior to pregnancy if possible.  We discussed risks specific to a cyst of this size would be rupture causing significant pain and ovarian torsion which required emergent surgical intervention. Since her periods remain normal,  she is likely ovulating and pregnancy would be fine otherwise.  Questions answered.  Plan for pelvic us and plan based on those results.    DUY WHITE MD

## 2023-01-08 ENCOUNTER — HEALTH MAINTENANCE LETTER (OUTPATIENT)
Age: 33
End: 2023-01-08

## 2023-04-23 ENCOUNTER — HEALTH MAINTENANCE LETTER (OUTPATIENT)
Age: 33
End: 2023-04-23

## 2024-06-30 ENCOUNTER — HEALTH MAINTENANCE LETTER (OUTPATIENT)
Age: 34
End: 2024-06-30

## 2024-11-19 ENCOUNTER — OFFICE VISIT (OUTPATIENT)
Dept: FAMILY MEDICINE | Facility: CLINIC | Age: 34
End: 2024-11-19
Payer: COMMERCIAL

## 2024-11-19 VITALS
OXYGEN SATURATION: 100 % | WEIGHT: 126.2 LBS | HEIGHT: 66 IN | RESPIRATION RATE: 20 BRPM | HEART RATE: 67 BPM | TEMPERATURE: 97.8 F | BODY MASS INDEX: 20.28 KG/M2 | DIASTOLIC BLOOD PRESSURE: 84 MMHG | SYSTOLIC BLOOD PRESSURE: 127 MMHG

## 2024-11-19 DIAGNOSIS — Z12.4 CERVICAL CANCER SCREENING: ICD-10-CM

## 2024-11-19 DIAGNOSIS — Z00.00 ROUTINE GENERAL MEDICAL EXAMINATION AT A HEALTH CARE FACILITY: Primary | ICD-10-CM

## 2024-11-19 DIAGNOSIS — Z13.6 CARDIOVASCULAR SCREENING; LDL GOAL LESS THAN 160: ICD-10-CM

## 2024-11-19 DIAGNOSIS — K50.90 CROHN'S DISEASE WITHOUT COMPLICATION, UNSPECIFIED GASTROINTESTINAL TRACT LOCATION (H): ICD-10-CM

## 2024-11-19 PROBLEM — O34.219 PREVIOUS CESAREAN DELIVERY, ANTEPARTUM CONDITION OR COMPLICATION: Status: RESOLVED | Noted: 2018-09-12 | Resolved: 2024-11-19

## 2024-11-19 PROBLEM — Z98.891 STATUS POST C-SECTION: Status: RESOLVED | Noted: 2019-04-05 | Resolved: 2024-11-19

## 2024-11-19 PROBLEM — O99.619: Status: RESOLVED | Noted: 2018-09-12 | Resolved: 2024-11-19

## 2024-11-19 PROBLEM — Z23 NEED FOR TDAP VACCINATION: Status: RESOLVED | Noted: 2018-09-12 | Resolved: 2024-11-19

## 2024-11-19 LAB
CHOLEST SERPL-MCNC: 148 MG/DL
FASTING STATUS PATIENT QL REPORTED: YES
FASTING STATUS PATIENT QL REPORTED: YES
GLUCOSE SERPL-MCNC: 92 MG/DL (ref 70–99)
HDLC SERPL-MCNC: 75 MG/DL
LDLC SERPL CALC-MCNC: 63 MG/DL
NONHDLC SERPL-MCNC: 73 MG/DL
TRIGL SERPL-MCNC: 52 MG/DL

## 2024-11-19 PROCEDURE — 90471 IMMUNIZATION ADMIN: CPT | Performed by: NURSE PRACTITIONER

## 2024-11-19 PROCEDURE — 90677 PCV20 VACCINE IM: CPT | Performed by: NURSE PRACTITIONER

## 2024-11-19 PROCEDURE — 36415 COLL VENOUS BLD VENIPUNCTURE: CPT | Performed by: NURSE PRACTITIONER

## 2024-11-19 PROCEDURE — 82947 ASSAY GLUCOSE BLOOD QUANT: CPT | Performed by: NURSE PRACTITIONER

## 2024-11-19 PROCEDURE — 87624 HPV HI-RISK TYP POOLED RSLT: CPT | Performed by: NURSE PRACTITIONER

## 2024-11-19 PROCEDURE — 80061 LIPID PANEL: CPT | Performed by: NURSE PRACTITIONER

## 2024-11-19 PROCEDURE — 99385 PREV VISIT NEW AGE 18-39: CPT | Mod: 25 | Performed by: NURSE PRACTITIONER

## 2024-11-19 SDOH — HEALTH STABILITY: PHYSICAL HEALTH: ON AVERAGE, HOW MANY DAYS PER WEEK DO YOU ENGAGE IN MODERATE TO STRENUOUS EXERCISE (LIKE A BRISK WALK)?: 6 DAYS

## 2024-11-19 ASSESSMENT — PAIN SCALES - GENERAL: PAINLEVEL_OUTOF10: NO PAIN (0)

## 2024-11-19 ASSESSMENT — SOCIAL DETERMINANTS OF HEALTH (SDOH): HOW OFTEN DO YOU GET TOGETHER WITH FRIENDS OR RELATIVES?: TWICE A WEEK

## 2024-11-19 NOTE — PATIENT INSTRUCTIONS
Patient Education   Preventive Care Advice   This is general advice given by our system to help you stay healthy. However, your care team may have specific advice just for you. Please talk to your care team about your preventive care needs.  Nutrition  Eat 5 or more servings of fruits and vegetables each day.  Try wheat bread, brown rice and whole grain pasta (instead of white bread, rice, and pasta).  Get enough calcium and vitamin D. Check the label on foods and aim for 100% of the RDA (recommended daily allowance).  Lifestyle  Exercise at least 150 minutes each week  (30 minutes a day, 5 days a week).  Do muscle strengthening activities 2 days a week. These help control your weight and prevent disease.  No smoking.  Wear sunscreen to prevent skin cancer.  Have a dental exam and cleaning every 6 months.  Yearly exams  See your health care team every year to talk about:  Any changes in your health.  Any medicines your care team has prescribed.  Preventive care, family planning, and ways to prevent chronic diseases.  Shots (vaccines)   HPV shots (up to age 26), if you've never had them before.  Hepatitis B shots (up to age 59), if you've never had them before.  COVID-19 shot: Get this shot when it's due.  Flu shot: Get a flu shot every year.  Tetanus shot: Get a tetanus shot every 10 years.  Pneumococcal, hepatitis A, and RSV shots: Ask your care team if you need these based on your risk.  Shingles shot (for age 50 and up)  General health tests  Diabetes screening:  Starting at age 35, Get screened for diabetes at least every 3 years.  If you are younger than age 35, ask your care team if you should be screened for diabetes.  Cholesterol test: At age 39, start having a cholesterol test every 5 years, or more often if advised.  Bone density scan (DEXA): At age 50, ask your care team if you should have this scan for osteoporosis (brittle bones).  Hepatitis C: Get tested at least once in your life.  STIs (sexually  transmitted infections)  Before age 24: Ask your care team if you should be screened for STIs.  After age 24: Get screened for STIs if you're at risk. You are at risk for STIs (including HIV) if:  You are sexually active with more than one person.  You don't use condoms every time.  You or a partner was diagnosed with a sexually transmitted infection.  If you are at risk for HIV, ask about PrEP medicine to prevent HIV.  Get tested for HIV at least once in your life, whether you are at risk for HIV or not.  Cancer screening tests  Cervical cancer screening: If you have a cervix, begin getting regular cervical cancer screening tests starting at age 21.  Breast cancer scan (mammogram): If you've ever had breasts, begin having regular mammograms starting at age 40. This is a scan to check for breast cancer.  Colon cancer screening: It is important to start screening for colon cancer at age 45.  Have a colonoscopy test every 10 years (or more often if you're at risk) Or, ask your provider about stool tests like a FIT test every year or Cologuard test every 3 years.  To learn more about your testing options, visit:   .  For help making a decision, visit:   https://bit.ly/ga17178.  Prostate cancer screening test: If you have a prostate, ask your care team if a prostate cancer screening test (PSA) at age 55 is right for you.  Lung cancer screening: If you are a current or former smoker ages 50 to 80, ask your care team if ongoing lung cancer screenings are right for you.  For informational purposes only. Not to replace the advice of your health care provider. Copyright   2023 [employer]. All rights reserved. Clinically reviewed by the [facility]itions Program. Soccer Manager [id number] - REV 01/24.

## 2024-11-19 NOTE — PROGRESS NOTES
Preventive Care Visit  [facility]  DAYSI Carvajal CNP, Family Medicine  Nov 19, 2024      Assessment & Plan     Routine general medical examination at a health care facility  -next preventative care visit in one year.     Cervical cancer screening    - HPV and Gynecologic Cytology Panel - Recommended Age 30 - 65 Years    Crohn's disease without complication, unspecified gastrointestinal tract location (H)  -not currently on any medications/suppressants, following at [facility].     CARDIOVASCULAR SCREENING; LDL GOAL LESS THAN 160    - Glucose  - Lipid panel reflex to direct LDL Fasting            Counseling  Appropriate preventive services were addressed with this patient via screening, questionnaire, or discussion as appropriate for fall prevention, nutrition, physical activity, Tobacco-use cessation, social engagement, weight loss and cognition.  Checklist reviewing preventive services available has been given to the patient.  Reviewed patient's diet, addressing concerns and/or questions.           Luis Lennon is a 34 year old, presenting for the following:  Physical        11/19/2024     7:51 AM   Additional Questions   Roomed by Joi RAPP   Accompanied by self      Patient here for yearly preventative care visit. In addition, they have the following concerns:        Menses regular. Condoms for contraception. Not planning on further pregnancies at this time.     Mammogram in past due to dense/lumpy breasts, benign. No new changes.           Fasting visit    Health Care Directive  Patient does not have a Health Care Directive: Discussed advance care planning with patient; however, patient declined at this time.      11/19/2024   General Health   How would you rate your overall physical health? Good   Feel stress (tense, anxious, or unable to sleep) Not at all            11/19/2024   Nutrition   Three or more servings of calcium each day? Yes   Diet: Regular (no restrictions)    How many servings of fruit and vegetables per day? 4 or more   How many sweetened beverages each day? 0-1            11/19/2024   Exercise   Days per week of moderate/strenous exercise 6 days            11/19/2024   Social Factors   Frequency of gathering with friends or relatives Twice a week   Worry food won't last until get money to buy more No   Food not last or not have enough money for food? No   Do you have housing? (Housing is defined as stable permanent housing and does not include staying ouside in a car, in a tent, in an abandoned building, in an overnight shelter, or couch-surfing.) Yes   Are you worried about losing your housing? No   Lack of transportation? No   Unable to get utilities (heat,electricity)? No            11/19/2024   Dental   Dentist two times every year? Yes            11/19/2024   TB Screening   Were you born outside of the [place]? No            Today's PHQ-2 Score:       11/19/2024     7:48 AM   PHQ-2 ( 1999 [employer])   Q1: Little interest or pleasure in doing things 0    Q2: Feeling down, depressed or hopeless 0    PHQ-2 Score 0    Q1: Little interest or pleasure in doing things Not at all   Q2: Feeling down, depressed or hopeless Not at all   PHQ-2 Score 0       Patient-reported           11/19/2024   Substance Use   Alcohol more than 3/day or more than 7/wk No   Do you use any other substances recreationally? No        Social History     Tobacco Use    Smoking status: Never    Smokeless tobacco: Never   Vaping Use    Vaping status: Never Used   Substance Use Topics    Alcohol use: No     Alcohol/week: 0.0 standard drinks of alcohol    Drug use: No          Mammogram Screening - Patient under 40 years of age: Routine Mammogram Screening not recommended.         11/19/2024   STI Screening   New sexual partner(s) since last STI/HIV test? No        History of abnormal Pap smear: No - age 30- 64 PAP with HPV every 5 years recommended        Latest Ref Rng & Units 9/16/2020     9:52 AM  "9/16/2020     9:50 AM 9/12/2018     2:32 PM   PAP / HPV   PAP (Historical)  NIL      HPV 16 DNA NEG^Negative  Negative  Negative    HPV 18 DNA NEG^Negative  Negative  Negative    Other HR HPV NEG^Negative  Negative  Negative            11/19/2024   Contraception/Family Planning   Questions about contraception or family planning No          Reviewed and updated as needed this visit by Provider   Tobacco  Allergies  Meds  Problems  Med Hx  Surg Hx  Fam Hx  Soc   Hx Sexual Activity               Objective    Exam  /84   Pulse 67   Temp 97.8  F (36.6  C) (Tympanic)   Resp 20   Ht 1.67 m (5' 5.75\")   Wt 57.2 kg (126 lb 3.2 oz)   LMP 11/07/2024 (Exact Date)   SpO2 100%   Breastfeeding No   BMI 20.52 kg/m     Estimated body mass index is 20.52 kg/m  as calculated from the following:    Height as of this encounter: 1.67 m (5' 5.75\").    Weight as of this encounter: 57.2 kg (126 lb 3.2 oz).    Physical Exam  Exam conducted with a chaperone present.   Constitutional:       Appearance: Normal appearance. She is not ill-appearing.   HENT:      Right Ear: Tympanic membrane, ear canal and external ear normal.      Left Ear: Tympanic membrane, ear canal and external ear normal.      Nose: Nose normal. No congestion.      Mouth/Throat:      Mouth: Mucous membranes are moist.      Pharynx: Oropharynx is clear.   Eyes:      Pupils: Pupils are equal, round, and reactive to light.   Cardiovascular:      Rate and Rhythm: Normal rate and regular rhythm.      Pulses: Normal pulses.      Heart sounds: Normal heart sounds. No murmur heard.     No friction rub. No gallop.   Pulmonary:      Effort: Pulmonary effort is normal.      Breath sounds: Normal breath sounds.   Abdominal:      General: Abdomen is flat. Bowel sounds are normal.      Palpations: Abdomen is soft.   Genitourinary:     General: Normal vulva.      Labia:         Right: No rash, tenderness, lesion or injury.         Left: No rash, tenderness, lesion or " injury.       Vagina: Normal. No vaginal discharge.      Cervix: Normal.   Musculoskeletal:         General: Normal range of motion.      Cervical back: Normal range of motion.   Lymphadenopathy:      Cervical: No cervical adenopathy.   Skin:     General: Skin is warm and dry.   Neurological:      General: No focal deficit present.      Mental Status: She is alert and oriented to person, place, and time.   Psychiatric:         Mood and Affect: Mood normal.         Behavior: Behavior normal.         Thought Content: Thought content normal.         Judgment: Judgment normal.               Signed Electronically by: DAYSI Carvajal CNP

## 2024-11-20 LAB
HPV HR 12 DNA CVX QL NAA+PROBE: NEGATIVE
HPV16 DNA CVX QL NAA+PROBE: NEGATIVE
HPV18 DNA CVX QL NAA+PROBE: NEGATIVE
HUMAN PAPILLOMA VIRUS FINAL DIAGNOSIS: NORMAL

## 2024-11-23 LAB
BKR AP ASSOCIATED HPV REPORT: NORMAL
BKR LAB AP GYN ADEQUACY: NORMAL
BKR LAB AP GYN INTERPRETATION: NORMAL
BKR LAB AP PREVIOUS ABNORMAL: NORMAL
PATH REPORT.COMMENTS IMP SPEC: NORMAL
PATH REPORT.COMMENTS IMP SPEC: NORMAL
PATH REPORT.RELEVANT HX SPEC: NORMAL

## (undated) DEVICE — PREP CHLORAPREP 26ML TINTED ORANGE  260815

## (undated) DEVICE — STRAP KNEE/BODY 31143004

## (undated) DEVICE — PACK C-SECTION LF PL15OTA83B

## (undated) DEVICE — SOL WATER IRRIG 1000ML BOTTLE 07139-09

## (undated) DEVICE — SU VICRYL 4-0 KS 27" UND J662H

## (undated) DEVICE — ESU GROUND PAD UNIVERSAL W/O CORD

## (undated) DEVICE — SOL NACL 0.9% IRRIG 1000ML BOTTLE 07138-09

## (undated) DEVICE — STOCKING SLEEVE COMPRESSION CALF LG

## (undated) DEVICE — BARRIER SEPRAFILM 5X6" SINGLE SHEET 4301-02

## (undated) DEVICE — BASIN SET MAJOR

## (undated) DEVICE — SUCTION CANISTER MEDIVAC LINER 1500ML W/LID 65651-515

## (undated) DEVICE — DRSG STERI STRIP 1/4X3" R1541

## (undated) DEVICE — SU VICRYL 3-0 CTX 36" UND J980H

## (undated) DEVICE — SOL ADH LIQUID BENZOIN SWAB 0.6ML C1544

## (undated) DEVICE — CATH TRAY FOLEY 16FR SILICONE 907416

## (undated) DEVICE — BNDG ABDOMINAL BINDER 10X26-50" 08140145

## (undated) DEVICE — SU VICRYL 0 CT-1 36" J346H

## (undated) DEVICE — GLOVE ESTEEM POWDER FREE SMT 6.5  2D72PT65

## (undated) DEVICE — GLOVE PROTEXIS BLUE W/NEU-THERA 7.0  2D73EB70

## (undated) RX ORDER — OXYTOCIN/0.9 % SODIUM CHLORIDE 30/500 ML
PLASTIC BAG, INJECTION (ML) INTRAVENOUS
Status: DISPENSED
Start: 2019-04-05

## (undated) RX ORDER — ACETAMINOPHEN 325 MG/1
TABLET ORAL
Status: DISPENSED
Start: 2019-04-05

## (undated) RX ORDER — ONDANSETRON 2 MG/ML
INJECTION INTRAMUSCULAR; INTRAVENOUS
Status: DISPENSED
Start: 2019-04-05

## (undated) RX ORDER — FENTANYL CITRATE 50 UG/ML
INJECTION, SOLUTION INTRAMUSCULAR; INTRAVENOUS
Status: DISPENSED
Start: 2019-04-05